# Patient Record
Sex: FEMALE | Race: WHITE | NOT HISPANIC OR LATINO | Employment: FULL TIME | URBAN - METROPOLITAN AREA
[De-identification: names, ages, dates, MRNs, and addresses within clinical notes are randomized per-mention and may not be internally consistent; named-entity substitution may affect disease eponyms.]

---

## 2018-02-28 ENCOUNTER — APPOINTMENT (OUTPATIENT)
Dept: PREADMISSION TESTING | Facility: HOSPITAL | Age: 48
End: 2018-02-28

## 2018-02-28 VITALS
SYSTOLIC BLOOD PRESSURE: 146 MMHG | HEIGHT: 69 IN | OXYGEN SATURATION: 100 % | RESPIRATION RATE: 20 BRPM | TEMPERATURE: 98.2 F | WEIGHT: 176.9 LBS | DIASTOLIC BLOOD PRESSURE: 86 MMHG | HEART RATE: 98 BPM | BODY MASS INDEX: 26.2 KG/M2

## 2018-02-28 LAB
ALBUMIN SERPL-MCNC: 4.4 G/DL (ref 3.5–5.2)
ALBUMIN/GLOB SERPL: 1.5 G/DL
ALP SERPL-CCNC: 47 U/L (ref 39–117)
ALT SERPL W P-5'-P-CCNC: 11 U/L (ref 1–33)
ANION GAP SERPL CALCULATED.3IONS-SCNC: 11.8 MMOL/L
AST SERPL-CCNC: 16 U/L (ref 1–32)
BACTERIA UR QL AUTO: ABNORMAL /HPF
BASOPHILS # BLD AUTO: 0.02 10*3/MM3 (ref 0–0.2)
BASOPHILS NFR BLD AUTO: 0.5 % (ref 0–1.5)
BILIRUB SERPL-MCNC: 0.8 MG/DL (ref 0.1–1.2)
BILIRUB UR QL STRIP: NEGATIVE
BUN BLD-MCNC: 15 MG/DL (ref 6–20)
BUN/CREAT SERPL: 19.7 (ref 7–25)
CALCIUM SPEC-SCNC: 9 MG/DL (ref 8.6–10.5)
CHLORIDE SERPL-SCNC: 101 MMOL/L (ref 98–107)
CLARITY UR: CLEAR
CO2 SERPL-SCNC: 24.2 MMOL/L (ref 22–29)
COLOR UR: YELLOW
CREAT BLD-MCNC: 0.76 MG/DL (ref 0.57–1)
DEPRECATED RDW RBC AUTO: 44 FL (ref 37–54)
EOSINOPHIL # BLD AUTO: 0.11 10*3/MM3 (ref 0–0.7)
EOSINOPHIL NFR BLD AUTO: 2.5 % (ref 0.3–6.2)
ERYTHROCYTE [DISTWIDTH] IN BLOOD BY AUTOMATED COUNT: 13.4 % (ref 11.7–13)
GFR SERPL CREATININE-BSD FRML MDRD: 81 ML/MIN/1.73
GLOBULIN UR ELPH-MCNC: 3 GM/DL
GLUCOSE BLD-MCNC: 159 MG/DL (ref 65–99)
GLUCOSE UR STRIP-MCNC: NEGATIVE MG/DL
HCG SERPL QL: NEGATIVE
HCT VFR BLD AUTO: 38.8 % (ref 35.6–45.5)
HGB BLD-MCNC: 12.8 G/DL (ref 11.9–15.5)
HGB UR QL STRIP.AUTO: NEGATIVE
HYALINE CASTS UR QL AUTO: ABNORMAL /LPF
KETONES UR QL STRIP: ABNORMAL
LEUKOCYTE ESTERASE UR QL STRIP.AUTO: ABNORMAL
LYMPHOCYTES # BLD AUTO: 1.36 10*3/MM3 (ref 0.9–4.8)
LYMPHOCYTES NFR BLD AUTO: 31.2 % (ref 19.6–45.3)
MCH RBC QN AUTO: 29.4 PG (ref 26.9–32)
MCHC RBC AUTO-ENTMCNC: 33 G/DL (ref 32.4–36.3)
MCV RBC AUTO: 89.2 FL (ref 80.5–98.2)
MONOCYTES # BLD AUTO: 0.5 10*3/MM3 (ref 0.2–1.2)
MONOCYTES NFR BLD AUTO: 11.5 % (ref 5–12)
NEUTROPHILS # BLD AUTO: 2.37 10*3/MM3 (ref 1.9–8.1)
NEUTROPHILS NFR BLD AUTO: 54.3 % (ref 42.7–76)
NITRITE UR QL STRIP: NEGATIVE
PH UR STRIP.AUTO: 6 [PH] (ref 5–8)
PLATELET # BLD AUTO: 239 10*3/MM3 (ref 140–500)
PMV BLD AUTO: 9.5 FL (ref 6–12)
POTASSIUM BLD-SCNC: 3.9 MMOL/L (ref 3.5–5.2)
PROT SERPL-MCNC: 7.4 G/DL (ref 6–8.5)
PROT UR QL STRIP: NEGATIVE
RBC # BLD AUTO: 4.35 10*6/MM3 (ref 3.9–5.2)
RBC # UR: ABNORMAL /HPF
REF LAB TEST METHOD: ABNORMAL
SODIUM BLD-SCNC: 137 MMOL/L (ref 136–145)
SP GR UR STRIP: 1.02 (ref 1–1.03)
SQUAMOUS #/AREA URNS HPF: ABNORMAL /HPF
UROBILINOGEN UR QL STRIP: ABNORMAL
WBC NRBC COR # BLD: 4.36 10*3/MM3 (ref 4.5–10.7)
WBC UR QL AUTO: ABNORMAL /HPF

## 2018-02-28 PROCEDURE — 84703 CHORIONIC GONADOTROPIN ASSAY: CPT | Performed by: ORTHOPAEDIC SURGERY

## 2018-02-28 PROCEDURE — 36415 COLL VENOUS BLD VENIPUNCTURE: CPT

## 2018-02-28 PROCEDURE — 93010 ELECTROCARDIOGRAM REPORT: CPT | Performed by: INTERNAL MEDICINE

## 2018-02-28 PROCEDURE — 81001 URINALYSIS AUTO W/SCOPE: CPT | Performed by: ORTHOPAEDIC SURGERY

## 2018-02-28 PROCEDURE — 85027 COMPLETE CBC AUTOMATED: CPT | Performed by: ORTHOPAEDIC SURGERY

## 2018-02-28 PROCEDURE — 93005 ELECTROCARDIOGRAM TRACING: CPT

## 2018-02-28 PROCEDURE — 80053 COMPREHEN METABOLIC PANEL: CPT | Performed by: ORTHOPAEDIC SURGERY

## 2018-02-28 RX ORDER — LISINOPRIL AND HYDROCHLOROTHIAZIDE 20; 12.5 MG/1; MG/1
1 TABLET ORAL DAILY
COMMUNITY

## 2018-03-06 ENCOUNTER — ANESTHESIA EVENT (OUTPATIENT)
Dept: PERIOP | Facility: HOSPITAL | Age: 48
End: 2018-03-06

## 2018-03-06 ENCOUNTER — ANESTHESIA (OUTPATIENT)
Dept: PERIOP | Facility: HOSPITAL | Age: 48
End: 2018-03-06

## 2018-03-06 ENCOUNTER — HOSPITAL ENCOUNTER (OUTPATIENT)
Facility: HOSPITAL | Age: 48
Setting detail: HOSPITAL OUTPATIENT SURGERY
Discharge: HOME | End: 2018-03-06
Attending: ORTHOPAEDIC SURGERY | Admitting: ORTHOPAEDIC SURGERY

## 2018-03-06 VITALS
DIASTOLIC BLOOD PRESSURE: 80 MMHG | RESPIRATION RATE: 18 BRPM | OXYGEN SATURATION: 100 % | TEMPERATURE: 97.6 F | SYSTOLIC BLOOD PRESSURE: 127 MMHG | HEART RATE: 70 BPM

## 2018-03-06 PROCEDURE — 25010000002 ROPIVACAINE PER 1 MG: Performed by: ANESTHESIOLOGY

## 2018-03-06 PROCEDURE — 25010000002 PROPOFOL 10 MG/ML EMULSION: Performed by: NURSE ANESTHETIST, CERTIFIED REGISTERED

## 2018-03-06 PROCEDURE — 25010000003 CEFAZOLIN IN DEXTROSE 2-4 GM/100ML-% SOLUTION: Performed by: ORTHOPAEDIC SURGERY

## 2018-03-06 PROCEDURE — 25010000002 MIDAZOLAM PER 1 MG: Performed by: ANESTHESIOLOGY

## 2018-03-06 PROCEDURE — 25010000002 DEXAMETHASONE PER 1 MG: Performed by: ANESTHESIOLOGY

## 2018-03-06 PROCEDURE — 25010000002 ONDANSETRON PER 1 MG: Performed by: NURSE ANESTHETIST, CERTIFIED REGISTERED

## 2018-03-06 PROCEDURE — 25010000002 EPINEPHRINE PER 0.1 MG: Performed by: ORTHOPAEDIC SURGERY

## 2018-03-06 RX ORDER — SODIUM CHLORIDE, SODIUM LACTATE, POTASSIUM CHLORIDE, CALCIUM CHLORIDE 600; 310; 30; 20 MG/100ML; MG/100ML; MG/100ML; MG/100ML
9 INJECTION, SOLUTION INTRAVENOUS CONTINUOUS
Status: DISCONTINUED | OUTPATIENT
Start: 2018-03-06 | End: 2018-03-06 | Stop reason: HOSPADM

## 2018-03-06 RX ORDER — FENTANYL CITRATE 50 UG/ML
50 INJECTION, SOLUTION INTRAMUSCULAR; INTRAVENOUS
Status: DISCONTINUED | OUTPATIENT
Start: 2018-03-06 | End: 2018-03-06 | Stop reason: HOSPADM

## 2018-03-06 RX ORDER — FLUMAZENIL 0.1 MG/ML
0.2 INJECTION INTRAVENOUS AS NEEDED
Status: DISCONTINUED | OUTPATIENT
Start: 2018-03-06 | End: 2018-03-06 | Stop reason: HOSPADM

## 2018-03-06 RX ORDER — PROPOFOL 10 MG/ML
VIAL (ML) INTRAVENOUS AS NEEDED
Status: DISCONTINUED | OUTPATIENT
Start: 2018-03-06 | End: 2018-03-06 | Stop reason: SURG

## 2018-03-06 RX ORDER — ONDANSETRON 2 MG/ML
4 INJECTION INTRAMUSCULAR; INTRAVENOUS ONCE AS NEEDED
Status: DISCONTINUED | OUTPATIENT
Start: 2018-03-06 | End: 2018-03-06 | Stop reason: HOSPADM

## 2018-03-06 RX ORDER — PROMETHAZINE HYDROCHLORIDE 25 MG/1
25 SUPPOSITORY RECTAL ONCE AS NEEDED
Status: DISCONTINUED | OUTPATIENT
Start: 2018-03-06 | End: 2018-03-06 | Stop reason: HOSPADM

## 2018-03-06 RX ORDER — MIDAZOLAM HYDROCHLORIDE 1 MG/ML
2 INJECTION INTRAMUSCULAR; INTRAVENOUS
Status: DISCONTINUED | OUTPATIENT
Start: 2018-03-06 | End: 2018-03-06 | Stop reason: HOSPADM

## 2018-03-06 RX ORDER — LIDOCAINE HYDROCHLORIDE 20 MG/ML
INJECTION, SOLUTION INFILTRATION; PERINEURAL AS NEEDED
Status: DISCONTINUED | OUTPATIENT
Start: 2018-03-06 | End: 2018-03-06 | Stop reason: SURG

## 2018-03-06 RX ORDER — HYDRALAZINE HYDROCHLORIDE 20 MG/ML
5 INJECTION INTRAMUSCULAR; INTRAVENOUS
Status: DISCONTINUED | OUTPATIENT
Start: 2018-03-06 | End: 2018-03-06 | Stop reason: HOSPADM

## 2018-03-06 RX ORDER — ONDANSETRON 2 MG/ML
INJECTION INTRAMUSCULAR; INTRAVENOUS AS NEEDED
Status: DISCONTINUED | OUTPATIENT
Start: 2018-03-06 | End: 2018-03-06 | Stop reason: SURG

## 2018-03-06 RX ORDER — CEFAZOLIN SODIUM 2 G/100ML
2 INJECTION, SOLUTION INTRAVENOUS ONCE
Status: COMPLETED | OUTPATIENT
Start: 2018-03-06 | End: 2018-03-06

## 2018-03-06 RX ORDER — FAMOTIDINE 10 MG/ML
20 INJECTION, SOLUTION INTRAVENOUS ONCE
Status: COMPLETED | OUTPATIENT
Start: 2018-03-06 | End: 2018-03-06

## 2018-03-06 RX ORDER — BUPIVACAINE HYDROCHLORIDE AND EPINEPHRINE 5; 5 MG/ML; UG/ML
INJECTION, SOLUTION PERINEURAL AS NEEDED
Status: DISCONTINUED | OUTPATIENT
Start: 2018-03-06 | End: 2018-03-06 | Stop reason: HOSPADM

## 2018-03-06 RX ORDER — NALOXONE HCL 0.4 MG/ML
0.2 VIAL (ML) INJECTION AS NEEDED
Status: DISCONTINUED | OUTPATIENT
Start: 2018-03-06 | End: 2018-03-06 | Stop reason: HOSPADM

## 2018-03-06 RX ORDER — SODIUM CHLORIDE 0.9 % (FLUSH) 0.9 %
1-10 SYRINGE (ML) INJECTION AS NEEDED
Status: DISCONTINUED | OUTPATIENT
Start: 2018-03-06 | End: 2018-03-06 | Stop reason: HOSPADM

## 2018-03-06 RX ORDER — EPHEDRINE SULFATE 50 MG/ML
5 INJECTION, SOLUTION INTRAVENOUS ONCE AS NEEDED
Status: DISCONTINUED | OUTPATIENT
Start: 2018-03-06 | End: 2018-03-06 | Stop reason: HOSPADM

## 2018-03-06 RX ORDER — PROMETHAZINE HYDROCHLORIDE 25 MG/1
12.5 TABLET ORAL ONCE AS NEEDED
Status: DISCONTINUED | OUTPATIENT
Start: 2018-03-06 | End: 2018-03-06 | Stop reason: HOSPADM

## 2018-03-06 RX ORDER — HYDROCODONE BITARTRATE AND ACETAMINOPHEN 7.5; 325 MG/1; MG/1
1 TABLET ORAL ONCE AS NEEDED
Status: DISCONTINUED | OUTPATIENT
Start: 2018-03-06 | End: 2018-03-06 | Stop reason: HOSPADM

## 2018-03-06 RX ORDER — OXYCODONE AND ACETAMINOPHEN 7.5; 325 MG/1; MG/1
1 TABLET ORAL ONCE AS NEEDED
Status: DISCONTINUED | OUTPATIENT
Start: 2018-03-06 | End: 2018-03-06 | Stop reason: HOSPADM

## 2018-03-06 RX ORDER — ROPIVACAINE HYDROCHLORIDE 5 MG/ML
INJECTION, SOLUTION EPIDURAL; INFILTRATION; PERINEURAL AS NEEDED
Status: DISCONTINUED | OUTPATIENT
Start: 2018-03-06 | End: 2018-03-06 | Stop reason: SURG

## 2018-03-06 RX ORDER — DIPHENHYDRAMINE HYDROCHLORIDE 50 MG/ML
12.5 INJECTION INTRAMUSCULAR; INTRAVENOUS
Status: DISCONTINUED | OUTPATIENT
Start: 2018-03-06 | End: 2018-03-06 | Stop reason: HOSPADM

## 2018-03-06 RX ORDER — ROCURONIUM BROMIDE 10 MG/ML
INJECTION, SOLUTION INTRAVENOUS AS NEEDED
Status: DISCONTINUED | OUTPATIENT
Start: 2018-03-06 | End: 2018-03-06 | Stop reason: SURG

## 2018-03-06 RX ORDER — DEXAMETHASONE SODIUM PHOSPHATE 4 MG/ML
INJECTION, SOLUTION INTRA-ARTICULAR; INTRALESIONAL; INTRAMUSCULAR; INTRAVENOUS; SOFT TISSUE AS NEEDED
Status: DISCONTINUED | OUTPATIENT
Start: 2018-03-06 | End: 2018-03-06 | Stop reason: SURG

## 2018-03-06 RX ORDER — LIDOCAINE HYDROCHLORIDE 10 MG/ML
0.5 INJECTION, SOLUTION EPIDURAL; INFILTRATION; INTRACAUDAL; PERINEURAL ONCE AS NEEDED
Status: DISCONTINUED | OUTPATIENT
Start: 2018-03-06 | End: 2018-03-06 | Stop reason: HOSPADM

## 2018-03-06 RX ORDER — LABETALOL HYDROCHLORIDE 5 MG/ML
5 INJECTION, SOLUTION INTRAVENOUS
Status: DISCONTINUED | OUTPATIENT
Start: 2018-03-06 | End: 2018-03-06 | Stop reason: HOSPADM

## 2018-03-06 RX ORDER — PROMETHAZINE HYDROCHLORIDE 25 MG/1
25 TABLET ORAL ONCE AS NEEDED
Status: DISCONTINUED | OUTPATIENT
Start: 2018-03-06 | End: 2018-03-06 | Stop reason: HOSPADM

## 2018-03-06 RX ORDER — PROMETHAZINE HYDROCHLORIDE 25 MG/ML
12.5 INJECTION, SOLUTION INTRAMUSCULAR; INTRAVENOUS ONCE AS NEEDED
Status: DISCONTINUED | OUTPATIENT
Start: 2018-03-06 | End: 2018-03-06 | Stop reason: HOSPADM

## 2018-03-06 RX ORDER — ACETAMINOPHEN 325 MG/1
650 TABLET ORAL ONCE
Status: COMPLETED | OUTPATIENT
Start: 2018-03-06 | End: 2018-03-06

## 2018-03-06 RX ADMIN — ROPIVACAINE HYDROCHLORIDE 20 ML: 5 INJECTION, SOLUTION EPIDURAL; INFILTRATION; PERINEURAL at 06:25

## 2018-03-06 RX ADMIN — SUGAMMADEX 200 MG: 100 INJECTION, SOLUTION INTRAVENOUS at 07:49

## 2018-03-06 RX ADMIN — ONDANSETRON 4 MG: 2 INJECTION INTRAMUSCULAR; INTRAVENOUS at 07:49

## 2018-03-06 RX ADMIN — MIDAZOLAM 2 MG: 1 INJECTION INTRAMUSCULAR; INTRAVENOUS at 06:20

## 2018-03-06 RX ADMIN — LIDOCAINE HYDROCHLORIDE 100 MG: 20 INJECTION, SOLUTION INFILTRATION; PERINEURAL at 06:52

## 2018-03-06 RX ADMIN — ROCURONIUM BROMIDE 40 MG: 10 INJECTION INTRAVENOUS at 06:52

## 2018-03-06 RX ADMIN — SODIUM CHLORIDE, POTASSIUM CHLORIDE, SODIUM LACTATE AND CALCIUM CHLORIDE 9 ML/HR: 600; 310; 30; 20 INJECTION, SOLUTION INTRAVENOUS at 06:12

## 2018-03-06 RX ADMIN — CEFAZOLIN SODIUM 2 G: 2 INJECTION, SOLUTION INTRAVENOUS at 06:51

## 2018-03-06 RX ADMIN — PROPOFOL 150 MG: 10 INJECTION, EMULSION INTRAVENOUS at 06:52

## 2018-03-06 RX ADMIN — FAMOTIDINE 20 MG: 10 INJECTION, SOLUTION INTRAVENOUS at 06:12

## 2018-03-06 RX ADMIN — DEXAMETHASONE SODIUM PHOSPHATE 4 MG: 4 INJECTION, SOLUTION INTRAMUSCULAR; INTRAVENOUS at 06:25

## 2018-03-06 RX ADMIN — ACETAMINOPHEN 650 MG: 325 TABLET, FILM COATED ORAL at 06:12

## 2018-03-06 NOTE — ANESTHESIA PROCEDURE NOTES
Peripheral Block    Patient location during procedure: pre-op  Reason for block: at surgeon's request and post-op pain management  Performed by  Anesthesiologist: RENDER, CHRISTIANO RAY  Preanesthetic Checklist  Completed: patient identified, site marked, surgical consent, pre-op evaluation, timeout performed, IV checked, risks and benefits discussed and monitors and equipment checked  Prep:  Sterile barriers:gloves  Prep: ChloraPrep  Patient monitoring: blood pressure monitoring, continuous pulse oximetry and EKG  Procedure  Sedation:yes    Guidance:ultrasound guided  ULTRASOUND INTERPRETATION.  Using ultrasound guidance a 22 G gauge needle was placed in close proximity to the brachial plexus nerve, at which point, under ultrasound guidance anesthetic was injected in the area of the nerve and spread of the anesthesia was seen on ultrasound in close proximity thereto.  There were no abnormalities seen on ultrasound; a digital image was taken; and the patient tolerated the procedure with no complications. Images:still images obtained    Block Type:interscalene  Injection Technique:single-shot  Needle Type:short-bevel  Needle Gauge:22 G    Medications  Analgesia: Dexamethasone 4mg.  Comment:Dexamethasone 4mg added to injectate  Local Injected:ropivacaine 0.5% Local Amount Injected:20mL  Post Assessment  Injection Assessment: negative aspiration for heme, no paresthesia on injection and incremental injection  Patient Tolerance:comfortable throughout block  Complications:no

## 2018-03-06 NOTE — BRIEF OP NOTE
SHOULDER ARTHROSCOPY  Progress Note    Sherrie Harris  3/6/2018    Pre-op Diagnosis:   Lt tam, ac djd, lt, bt       Post-Op Diagnosis Codes:   same, djd    Procedure/CPT® Codes:      Procedure(s):  LT SHOULDER ARTHROSCOPY SUBACROMIAL DECOMPRESSION DISTAL CLAVICLE EXCISION BICEPS TENOTOMY    Surgeon(s):  TITUS Huntley MD    Anesthesia: General with Block    Staff:   Circulator: Malissa Aviles RN  Scrub Person: Ayad Milton  Assistant: Maern Sutton    Estimated Blood Loss: minimal    Urine Voided: * No values recorded between 3/6/2018  6:49 AM and 3/6/2018  7:50 AM *    Specimens:                None      Drains:           Findings: above    Complications: none known      OLGA Huntley MD     Date: 3/6/2018  Time: 7:50 AM

## 2018-03-06 NOTE — ANESTHESIA POSTPROCEDURE EVALUATION
Patient: Sherrie Harris    Procedure Summary     Date Anesthesia Start Anesthesia Stop Room / Location    03/06/18 0651 0801  [facility]  [facility]       Procedure Diagnosis Surgeon Provider    LEFT SHOULDER ARTHROSCOPY,  SUBACROMIAL DECOMPRESSION,  DISTAL CLAVICLE EXCISION,  BICEPS TENOTOMY, and LABRAL DEBRIDEMENT (Left Shoulder) No diagnosis on file. MD Yuri Yeh MD          Anesthesia Type: general, regional  Last vitals  BP   127/80 (03/06/18 0845)   Temp   36.4 °C (97.6 °F) (03/06/18 0830)   Pulse   70 (03/06/18 0908)   Resp   18 (03/06/18 0908)     SpO2   100 % (03/06/18 0908)     Post Anesthesia Care and Evaluation    Patient location during evaluation: bedside  Patient participation: complete - patient participated  Level of consciousness: awake and alert  Pain management: adequate  Airway patency: patent  Anesthetic complications: No anesthetic complications    Cardiovascular status: acceptable  Respiratory status: acceptable  Hydration status: acceptable    Comments: /80 (BP Location: Right arm, Patient Position: Lying)  Pulse 70  Temp 36.4 °C (97.6 °F)  Resp 18  LMP 03/06/2018  SpO2 100%

## 2018-03-06 NOTE — PLAN OF CARE
Problem: Patient Care Overview (Adult)  Goal: Plan of Care Review  Outcome: Ongoing (interventions implemented as appropriate)   03/06/18 0814   Coping/Psychosocial Response Interventions   Plan Of Care Reviewed With patient   Patient Care Overview   Progress improving

## 2018-03-06 NOTE — PLAN OF CARE
Problem: Perioperative Period (Adult)  Goal: Signs and Symptoms of Listed Potential Problems Will be Absent or Manageable (Perioperative Period)   03/06/18 0814   Perioperative Period   Problems Assessed (Perioperative Period) all   Problems Present (Perioperative Period) none

## 2018-03-06 NOTE — ANESTHESIA PROCEDURE NOTES
Airway  Urgency: elective    Airway not difficult    General Information and Staff    Patient location during procedure: OR  Anesthesiologist: RENDER, CHRISTIANO RAY  CRNA: VAZQUEZ ENRIQUEZ    Indications and Patient Condition  Indications for airway management: airway protection    Preoxygenated: yes  MILS not maintained throughout  Mask difficulty assessment: 1 - vent by mask    Final Airway Details  Final airway type: endotracheal airway      Successful airway: ETT  Cuffed: yes   Successful intubation technique: direct laryngoscopy  Facilitating devices/methods: intubating stylet  Endotracheal tube insertion site: oral  Blade: Metz  Blade size: #2  ETT size: 7.0 mm  Cormack-Lehane Classification: grade IIb - view of arytenoids or posterior of glottis only  Placement verified by: chest auscultation and capnometry   Cuff volume (mL): 8  Measured from: lips  Number of attempts at approach: 1    Additional Comments  PreO2, SIVI, easy BMV, minimal mouth opening, grade II-III view, ett placed X 1 attempt w eschmann, appears atraumatic, dentition intact

## 2018-03-06 NOTE — PLAN OF CARE
Problem: Patient Care Overview (Adult)  Goal: Plan of Care Review  Outcome: Ongoing (interventions implemented as appropriate)   03/06/18 0554   Coping/Psychosocial Response Interventions   Plan Of Care Reviewed With patient   Patient Care Overview   Progress improving     Goal: Adult Individualization and Mutuality  Outcome: Ongoing (interventions implemented as appropriate)    Goal: Discharge Needs Assessment  Outcome: Ongoing (interventions implemented as appropriate)   03/06/18 0554   Discharge Needs Assessment   Concerns To Be Addressed no discharge needs identified

## 2018-03-06 NOTE — ANESTHESIA POSTPROCEDURE EVALUATION
Patient: Sherrie Harris    Procedure Summary     Date Anesthesia Start Anesthesia Stop Room / Location    03/06/18 0651 0801  [facility]       Procedure Diagnosis Surgeon Provider    LEFT SHOULDER ARTHROSCOPY,  SUBACROMIAL DECOMPRESSION,  DISTAL CLAVICLE EXCISION,  BICEPS TENOTOMY, and LABRAL DEBRIDEMENT (Left Shoulder) No diagnosis on file. MD Yuri Yeh MD          Anesthesia Type: general, regional  Last vitals  BP   127/80 (03/06/18 0845)   Temp   36.4 °C (97.6 °F) (03/06/18 0830)   Pulse   70 (03/06/18 0908)   Resp   18 (03/06/18 0908)     SpO2   100 % (03/06/18 0908)     Post Anesthesia Care and Evaluation    Patient location during evaluation: bedside  Patient participation: complete - patient participated  Level of consciousness: awake  Pain score: 1  Pain management: adequate  Airway patency: patent  Anesthetic complications: No anesthetic complications    Cardiovascular status: acceptable  Respiratory status: acceptable  Hydration status: acceptable    Comments: /80 (BP Location: Right arm, Patient Position: Lying)  Pulse 70  Temp 36.4 °C (97.6 °F)  Resp 18  LMP 03/06/2018  SpO2 100%

## 2018-03-06 NOTE — PLAN OF CARE
Problem: Perioperative Period (Adult)  Goal: Signs and Symptoms of Listed Potential Problems Will be Absent or Manageable (Perioperative Period)  Outcome: Ongoing (interventions implemented as appropriate)   03/06/18 0553   Perioperative Period   Problems Assessed (Perioperative Period) all   Problems Present (Perioperative Period) none

## 2021-10-08 ENCOUNTER — TRANSCRIBE ORDERS (OUTPATIENT)
Dept: ADMINISTRATIVE | Facility: HOSPITAL | Age: 51
End: 2021-10-08

## 2021-10-08 DIAGNOSIS — M79.671 RIGHT FOOT PAIN: Primary | ICD-10-CM

## 2021-10-28 ENCOUNTER — HOSPITAL ENCOUNTER (OUTPATIENT)
Dept: GENERAL RADIOLOGY | Facility: HOSPITAL | Age: 51
Discharge: HOME | End: 2021-10-28
Admitting: ORTHOPAEDIC SURGERY

## 2021-10-28 DIAGNOSIS — M79.671 RIGHT FOOT PAIN: ICD-10-CM

## 2021-10-28 PROCEDURE — 25010000002 METHYLPREDNISOLONE PER 125 MG: Performed by: ORTHOPAEDIC SURGERY

## 2021-10-28 PROCEDURE — 77002 NEEDLE LOCALIZATION BY XRAY: CPT

## 2021-10-28 PROCEDURE — 0 LIDOCAINE 1 % SOLUTION: Performed by: ORTHOPAEDIC SURGERY

## 2021-10-28 PROCEDURE — 25010000002 IOPAMIDOL 61 % SOLUTION: Performed by: ORTHOPAEDIC SURGERY

## 2021-10-28 RX ORDER — LIDOCAINE HYDROCHLORIDE 10 MG/ML
10 INJECTION, SOLUTION INFILTRATION; PERINEURAL ONCE
Status: COMPLETED | OUTPATIENT
Start: 2021-10-28 | End: 2021-10-28

## 2021-10-28 RX ORDER — METHYLPREDNISOLONE SODIUM SUCCINATE 125 MG/2ML
80 INJECTION, POWDER, LYOPHILIZED, FOR SOLUTION INTRAMUSCULAR; INTRAVENOUS
Status: COMPLETED | OUTPATIENT
Start: 2021-10-28 | End: 2021-10-28

## 2021-10-28 RX ORDER — BUPIVACAINE HYDROCHLORIDE 2.5 MG/ML
10 INJECTION, SOLUTION EPIDURAL; INFILTRATION; INTRACAUDAL ONCE
Status: COMPLETED | OUTPATIENT
Start: 2021-10-28 | End: 2021-10-28

## 2021-10-28 RX ADMIN — METHYLPREDNISOLONE SODIUM SUCCINATE 80 MG: 125 INJECTION, POWDER, LYOPHILIZED, FOR SOLUTION INTRAMUSCULAR; INTRAVENOUS at 14:54

## 2021-10-28 RX ADMIN — LIDOCAINE HYDROCHLORIDE 1 ML: 10 INJECTION, SOLUTION INFILTRATION; PERINEURAL at 14:54

## 2021-10-28 RX ADMIN — IOPAMIDOL 1 ML: 612 INJECTION, SOLUTION INTRAVENOUS at 14:54

## 2021-10-28 RX ADMIN — BUPIVACAINE HYDROCHLORIDE 5 ML: 2.5 INJECTION, SOLUTION EPIDURAL; INFILTRATION; INTRACAUDAL; PERINEURAL at 14:54

## 2021-12-21 ENCOUNTER — TRANSCRIBE ORDERS (OUTPATIENT)
Dept: ADMINISTRATIVE | Facility: HOSPITAL | Age: 51
End: 2021-12-21

## 2021-12-21 ENCOUNTER — HOSPITAL ENCOUNTER (OUTPATIENT)
Dept: CARDIOLOGY | Facility: HOSPITAL | Age: 51
Discharge: HOME | End: 2021-12-21

## 2021-12-21 ENCOUNTER — LAB (OUTPATIENT)
Dept: LAB | Facility: HOSPITAL | Age: 51
End: 2021-12-21

## 2021-12-21 DIAGNOSIS — Z01.818 PRE-OP TESTING: Primary | ICD-10-CM

## 2021-12-21 DIAGNOSIS — Z01.818 PRE-OP TESTING: ICD-10-CM

## 2021-12-21 LAB
ALBUMIN SERPL-MCNC: 4.7 G/DL (ref 3.5–5.2)
ALBUMIN/GLOB SERPL: 1.5 G/DL
ALP SERPL-CCNC: 63 U/L (ref 39–117)
ALT SERPL W P-5'-P-CCNC: 11 U/L (ref 1–33)
ANION GAP SERPL CALCULATED.3IONS-SCNC: 8.3 MMOL/L (ref 5–15)
AST SERPL-CCNC: 18 U/L (ref 1–32)
BASOPHILS # BLD AUTO: 0.04 10*3/MM3 (ref 0–0.2)
BASOPHILS NFR BLD AUTO: 1 % (ref 0–1.5)
BILIRUB SERPL-MCNC: 0.8 MG/DL (ref 0–1.2)
BUN SERPL-MCNC: 12 MG/DL (ref 6–20)
BUN/CREAT SERPL: 17.6 (ref 7–25)
CALCIUM SPEC-SCNC: 9.6 MG/DL (ref 8.6–10.5)
CHLORIDE SERPL-SCNC: 102 MMOL/L (ref 98–107)
CO2 SERPL-SCNC: 29.7 MMOL/L (ref 22–29)
CREAT SERPL-MCNC: 0.68 MG/DL (ref 0.57–1)
DEPRECATED RDW RBC AUTO: 43.3 FL (ref 37–54)
EOSINOPHIL # BLD AUTO: 0.12 10*3/MM3 (ref 0–0.4)
EOSINOPHIL NFR BLD AUTO: 3 % (ref 0.3–6.2)
ERYTHROCYTE [DISTWIDTH] IN BLOOD BY AUTOMATED COUNT: 13.4 % (ref 12.3–15.4)
GFR SERPL CREATININE-BSD FRML MDRD: 91 ML/MIN/1.73
GLOBULIN UR ELPH-MCNC: 3.1 GM/DL
GLUCOSE SERPL-MCNC: 163 MG/DL (ref 65–99)
HCT VFR BLD AUTO: 40.3 % (ref 34–46.6)
HGB BLD-MCNC: 13.3 G/DL (ref 12–15.9)
IMM GRANULOCYTES # BLD AUTO: 0 10*3/MM3 (ref 0–0.05)
IMM GRANULOCYTES NFR BLD AUTO: 0 % (ref 0–0.5)
LYMPHOCYTES # BLD AUTO: 0.93 10*3/MM3 (ref 0.7–3.1)
LYMPHOCYTES NFR BLD AUTO: 23.1 % (ref 19.6–45.3)
MCH RBC QN AUTO: 29 PG (ref 26.6–33)
MCHC RBC AUTO-ENTMCNC: 33 G/DL (ref 31.5–35.7)
MCV RBC AUTO: 88 FL (ref 79–97)
MONOCYTES # BLD AUTO: 0.36 10*3/MM3 (ref 0.1–0.9)
MONOCYTES NFR BLD AUTO: 9 % (ref 5–12)
NEUTROPHILS NFR BLD AUTO: 2.57 10*3/MM3 (ref 1.7–7)
NEUTROPHILS NFR BLD AUTO: 63.9 % (ref 42.7–76)
NRBC BLD AUTO-RTO: 0 /100 WBC (ref 0–0.2)
PLATELET # BLD AUTO: 273 10*3/MM3 (ref 140–450)
PMV BLD AUTO: 9.4 FL (ref 6–12)
POTASSIUM SERPL-SCNC: 4.3 MMOL/L (ref 3.5–5.2)
PROT SERPL-MCNC: 7.8 G/DL (ref 6–8.5)
QT INTERVAL: 411 MS
RBC # BLD AUTO: 4.58 10*6/MM3 (ref 3.77–5.28)
SODIUM SERPL-SCNC: 140 MMOL/L (ref 136–145)
WBC NRBC COR # BLD: 4.02 10*3/MM3 (ref 3.4–10.8)

## 2021-12-21 PROCEDURE — 93010 ELECTROCARDIOGRAM REPORT: CPT | Performed by: INTERNAL MEDICINE

## 2021-12-21 PROCEDURE — 85025 COMPLETE CBC W/AUTO DIFF WBC: CPT

## 2021-12-21 PROCEDURE — 93005 ELECTROCARDIOGRAM TRACING: CPT | Performed by: ORTHOPAEDIC SURGERY

## 2021-12-21 PROCEDURE — 36415 COLL VENOUS BLD VENIPUNCTURE: CPT

## 2021-12-21 PROCEDURE — 80053 COMPREHEN METABOLIC PANEL: CPT

## 2023-10-05 ENCOUNTER — LAB (OUTPATIENT)
Dept: LAB | Facility: HOSPITAL | Age: 53
End: 2023-10-05
Payer: MEDICAID

## 2023-10-05 ENCOUNTER — HOSPITAL ENCOUNTER (OUTPATIENT)
Dept: CARDIOLOGY | Facility: HOSPITAL | Age: 53
Discharge: HOME | End: 2023-10-05
Payer: MEDICAID

## 2023-10-05 ENCOUNTER — TRANSCRIBE ORDERS (OUTPATIENT)
Dept: ADMINISTRATIVE | Facility: HOSPITAL | Age: 53
End: 2023-10-05
Payer: MEDICAID

## 2023-10-05 DIAGNOSIS — Z01.818 PRE-OP TESTING: Primary | ICD-10-CM

## 2023-10-05 DIAGNOSIS — Z01.818 PRE-OP TESTING: ICD-10-CM

## 2023-10-05 LAB
ALBUMIN SERPL-MCNC: 5 G/DL (ref 3.5–5.2)
ALBUMIN/GLOB SERPL: 2 G/DL
ALP SERPL-CCNC: 90 U/L (ref 39–117)
ALT SERPL W P-5'-P-CCNC: 14 U/L (ref 1–33)
ANION GAP SERPL CALCULATED.3IONS-SCNC: 10.2 MMOL/L (ref 5–15)
AST SERPL-CCNC: 19 U/L (ref 1–32)
BASOPHILS # BLD AUTO: 0.05 10*3/MM3 (ref 0–0.2)
BASOPHILS NFR BLD AUTO: 1.3 % (ref 0–1.5)
BILIRUB SERPL-MCNC: 0.7 MG/DL (ref 0–1.2)
BUN SERPL-MCNC: 14 MG/DL (ref 6–20)
BUN/CREAT SERPL: 20.6 (ref 7–25)
CALCIUM SPEC-SCNC: 9.8 MG/DL (ref 8.6–10.5)
CHLORIDE SERPL-SCNC: 98 MMOL/L (ref 98–107)
CO2 SERPL-SCNC: 28.8 MMOL/L (ref 22–29)
CREAT SERPL-MCNC: 0.68 MG/DL (ref 0.57–1)
DEPRECATED RDW RBC AUTO: 43.5 FL (ref 37–54)
EGFRCR SERPLBLD CKD-EPI 2021: 103.6 ML/MIN/1.73
EOSINOPHIL # BLD AUTO: 0.12 10*3/MM3 (ref 0–0.4)
EOSINOPHIL NFR BLD AUTO: 3.1 % (ref 0.3–6.2)
ERYTHROCYTE [DISTWIDTH] IN BLOOD BY AUTOMATED COUNT: 13.9 % (ref 12.3–15.4)
GLOBULIN UR ELPH-MCNC: 2.5 GM/DL
GLUCOSE SERPL-MCNC: 142 MG/DL (ref 65–99)
HCT VFR BLD AUTO: 36 % (ref 34–46.6)
HGB BLD-MCNC: 12 G/DL (ref 12–15.9)
IMM GRANULOCYTES # BLD AUTO: 0 10*3/MM3 (ref 0–0.05)
IMM GRANULOCYTES NFR BLD AUTO: 0 % (ref 0–0.5)
LYMPHOCYTES # BLD AUTO: 1.33 10*3/MM3 (ref 0.7–3.1)
LYMPHOCYTES NFR BLD AUTO: 34.4 % (ref 19.6–45.3)
MCH RBC QN AUTO: 28.5 PG (ref 26.6–33)
MCHC RBC AUTO-ENTMCNC: 33.3 G/DL (ref 31.5–35.7)
MCV RBC AUTO: 85.5 FL (ref 79–97)
MONOCYTES # BLD AUTO: 0.35 10*3/MM3 (ref 0.1–0.9)
MONOCYTES NFR BLD AUTO: 9 % (ref 5–12)
NEUTROPHILS NFR BLD AUTO: 2.02 10*3/MM3 (ref 1.7–7)
NEUTROPHILS NFR BLD AUTO: 52.2 % (ref 42.7–76)
NRBC BLD AUTO-RTO: 0 /100 WBC (ref 0–0.2)
PLATELET # BLD AUTO: 237 10*3/MM3 (ref 140–450)
PMV BLD AUTO: 9.6 FL (ref 6–12)
POTASSIUM SERPL-SCNC: 3.3 MMOL/L (ref 3.5–5.2)
PROT SERPL-MCNC: 7.5 G/DL (ref 6–8.5)
QT INTERVAL: 398 MS
QTC INTERVAL: 460 MS
RBC # BLD AUTO: 4.21 10*6/MM3 (ref 3.77–5.28)
SODIUM SERPL-SCNC: 137 MMOL/L (ref 136–145)
WBC NRBC COR # BLD: 3.87 10*3/MM3 (ref 3.4–10.8)

## 2023-10-05 PROCEDURE — 36415 COLL VENOUS BLD VENIPUNCTURE: CPT

## 2023-10-05 PROCEDURE — 80053 COMPREHEN METABOLIC PANEL: CPT

## 2023-10-05 PROCEDURE — 85025 COMPLETE CBC W/AUTO DIFF WBC: CPT

## 2023-10-05 PROCEDURE — 93005 ELECTROCARDIOGRAM TRACING: CPT | Performed by: ORTHOPAEDIC SURGERY

## 2024-06-06 ENCOUNTER — OFFICE VISIT (OUTPATIENT)
Dept: FAMILY MEDICINE CLINIC | Facility: CLINIC | Age: 54
End: 2024-06-06
Payer: MEDICAID

## 2024-06-06 VITALS
HEIGHT: 69 IN | BODY MASS INDEX: 25.48 KG/M2 | DIASTOLIC BLOOD PRESSURE: 80 MMHG | OXYGEN SATURATION: 99 % | WEIGHT: 172 LBS | SYSTOLIC BLOOD PRESSURE: 140 MMHG | HEART RATE: 84 BPM

## 2024-06-06 DIAGNOSIS — Z12.11 SCREENING FOR COLON CANCER: ICD-10-CM

## 2024-06-06 DIAGNOSIS — I10 BENIGN ESSENTIAL HTN: ICD-10-CM

## 2024-06-06 DIAGNOSIS — Z00.00 ANNUAL PHYSICAL EXAM: Primary | ICD-10-CM

## 2024-06-06 DIAGNOSIS — Z12.4 SCREENING FOR CERVICAL CANCER: ICD-10-CM

## 2024-06-06 DIAGNOSIS — Z13.21 ENCOUNTER FOR VITAMIN DEFICIENCY SCREENING: ICD-10-CM

## 2024-06-06 DIAGNOSIS — Z11.59 NEED FOR HEPATITIS C SCREENING TEST: ICD-10-CM

## 2024-06-06 DIAGNOSIS — Z13.220 SCREENING CHOLESTEROL LEVEL: ICD-10-CM

## 2024-06-06 DIAGNOSIS — Z23 IMMUNIZATION DUE: ICD-10-CM

## 2024-06-06 DIAGNOSIS — Z12.31 ENCOUNTER FOR SCREENING MAMMOGRAM FOR MALIGNANT NEOPLASM OF BREAST: ICD-10-CM

## 2024-06-06 LAB
BILIRUB BLD-MCNC: NEGATIVE MG/DL
CLARITY, POC: CLEAR
COLOR UR: YELLOW
EXPIRATION DATE: NORMAL
GLUCOSE UR STRIP-MCNC: NEGATIVE MG/DL
KETONES UR QL: NEGATIVE
LEUKOCYTE EST, POC: NEGATIVE
Lab: NORMAL
NITRITE UR-MCNC: NEGATIVE MG/ML
PH UR: 5.5 [PH] (ref 5–8)
PROT UR STRIP-MCNC: NEGATIVE MG/DL
RBC # UR STRIP: NEGATIVE /UL
SP GR UR: 1.02 (ref 1–1.03)
UROBILINOGEN UR QL: NORMAL

## 2024-06-06 PROCEDURE — 90715 TDAP VACCINE 7 YRS/> IM: CPT | Performed by: NURSE PRACTITIONER

## 2024-06-06 PROCEDURE — 1159F MED LIST DOCD IN RCRD: CPT | Performed by: NURSE PRACTITIONER

## 2024-06-06 PROCEDURE — 90471 IMMUNIZATION ADMIN: CPT | Performed by: NURSE PRACTITIONER

## 2024-06-06 PROCEDURE — 3079F DIAST BP 80-89 MM HG: CPT | Performed by: NURSE PRACTITIONER

## 2024-06-06 PROCEDURE — 1160F RVW MEDS BY RX/DR IN RCRD: CPT | Performed by: NURSE PRACTITIONER

## 2024-06-06 PROCEDURE — 2014F MENTAL STATUS ASSESS: CPT | Performed by: NURSE PRACTITIONER

## 2024-06-06 PROCEDURE — 3077F SYST BP >= 140 MM HG: CPT | Performed by: NURSE PRACTITIONER

## 2024-06-06 PROCEDURE — 99386 PREV VISIT NEW AGE 40-64: CPT | Performed by: NURSE PRACTITIONER

## 2024-06-06 PROCEDURE — 81003 URINALYSIS AUTO W/O SCOPE: CPT | Performed by: NURSE PRACTITIONER

## 2024-06-06 RX ORDER — HYDROCHLOROTHIAZIDE 25 MG/1
25 TABLET ORAL DAILY
COMMUNITY
End: 2024-06-06 | Stop reason: SDUPTHER

## 2024-06-06 RX ORDER — HYDROCHLOROTHIAZIDE 25 MG/1
25 TABLET ORAL DAILY
Qty: 90 TABLET | Refills: 3 | Status: SHIPPED | OUTPATIENT
Start: 2024-06-06

## 2024-06-06 RX ORDER — HYDROCHLOROTHIAZIDE 12.5 MG/1
1 TABLET ORAL DAILY
COMMUNITY
Start: 2023-09-28 | End: 2024-06-06

## 2024-06-06 RX ORDER — AMLODIPINE BESYLATE 5 MG/1
5 TABLET ORAL DAILY
Qty: 90 TABLET | Refills: 3 | Status: SHIPPED | OUTPATIENT
Start: 2024-06-06

## 2024-06-06 RX ORDER — IBUPROFEN 800 MG/1
TABLET ORAL
COMMUNITY
End: 2024-06-06

## 2024-06-06 RX ORDER — AMLODIPINE BESYLATE 5 MG/1
TABLET ORAL
COMMUNITY
Start: 2023-09-28 | End: 2024-06-06 | Stop reason: SDUPTHER

## 2024-06-06 NOTE — PROGRESS NOTES
"Chief Complaint  Establish Care (Switching care from [facility] )    Subjective          Sherrie Harris presents to [facility] for preventative yearly exam.   History of Present Illness    Presents to establish care, annual exam, and refills.  Past history of hypertension and doing well on amlodipine and hydrochlorothiazide.  No smoking no alcohol use.  Tries to watch her diet she does stay active walking 3 miles a day.  She is due a mammogram and a colonoscopy.  States she follows up [facility] and they keep up-to-date with her Pap smears.  She would like to get a Tdap vaccine today and denies other immunizations.  No dizziness no headache no chest pain no chest pressure no shortness of breath no trouble breathing no urinary or bowel issues.  Overall states she is doing well.    Objective   Vital Signs:   /80   Pulse 84   Ht 175.3 cm (69\")   Wt 78 kg (172 lb)   SpO2 99%   BMI 25.40 kg/m²     Body mass index is 25.4 kg/m².    Predictive Model Details   No score data available for Risk of Fall        PHQ-9 Depression Screening  Little interest or pleasure in doing things? 0-->not at all   Feeling down, depressed, or hopeless? 0-->not at all   Trouble falling or staying asleep, or sleeping too much?     Feeling tired or having little energy?     Poor appetite or overeating?     Feeling bad about yourself - or that you are a failure or have let yourself or your family down?     Trouble concentrating on things, such as reading the newspaper or watching television?     Moving or speaking so slowly that other people could have noticed? Or the opposite - being so fidgety or restless that you have been moving around a lot more than usual?     Thoughts that you would be better off dead, or of hurting yourself in some way?     PHQ-9 Total Score 0   If you checked off any problems, how difficult have these problems made it for you to " do your work, take care of things at home, or get along with other people?       Patient screened positive for depression based on a PHQ-9 score of 0 on 6/6/2024. Follow-up recommendations include:        Immunization History   Administered Date(s) Administered    31-influenza Vac Quardvalent Preservativ 10/01/2020    COVID-19 (MODERNA) 1st,2nd,3rd Dose Monovalent 02/01/2020, 03/25/2021, 04/30/2021    Flublok 18+yrs 10/10/2022    Fluzone (or Fluarix & Flulaval for VFC) >6mos 10/01/2018, 10/01/2020    Fluzone Quad >6mos (Multi-dose) 10/01/2018    Hepatitis A 10/01/2018    INFLUENZA NASAL UF 01/01/2022    Influenza MDCK Quadrivalent with Preserative 10/21/2021       Review of Systems   Constitutional: Negative.    HENT: Negative.     Eyes: Negative.    Respiratory: Negative.     Cardiovascular: Negative.    Gastrointestinal: Negative.    Endocrine: Negative for polydipsia, polyphagia and polyuria.   Genitourinary: Negative.    Musculoskeletal: Negative.    Skin: Negative.    Neurological: Negative.    Psychiatric/Behavioral: Negative.         Past History:  Medical History: has a past medical history of Hypertension, Left shoulder pain, and Palpitations.   Surgical History: has a past surgical history that includes Lipoma Excision (2016); Uterine fibroid surgery; ORIF finger / thumb fracture (2004); and Shoulder arthroscopy (Left, 3/6/2018).   Family History: family history includes Diabetes in her mother; Hypertension in her father and mother.   Social History: reports that she has never smoked. She does not have any smokeless tobacco history on file. She reports current alcohol use. She reports that she does not use drugs.      Current Outpatient Medications:     amLODIPine (NORVASC) 5 MG tablet, Take 1 tablet by mouth Daily., Disp: 90 tablet, Rfl: 3    hydroCHLOROthiazide 25 MG tablet, Take 1 tablet by mouth Daily., Disp: 90 tablet, Rfl: 3    VITAMIN D PO, Take 3,000 Units by mouth., Disp: , Rfl:    Allergies:  Amoxicillin-pot clavulanate    Physical Exam  Constitutional:       Appearance: Normal appearance.   HENT:      Head: Normocephalic.      Right Ear: Tympanic membrane, ear canal and external ear normal.      Left Ear: Tympanic membrane, ear canal and external ear normal.      Nose: Nose normal.      Mouth/Throat:      Mouth: Mucous membranes are moist.      Pharynx: Oropharynx is clear.   Eyes:      Extraocular Movements: Extraocular movements intact.      Conjunctiva/sclera: Conjunctivae normal.      Pupils: Pupils are equal, round, and reactive to light.   Cardiovascular:      Rate and Rhythm: Normal rate and regular rhythm.      Heart sounds: Normal heart sounds.   Pulmonary:      Effort: Pulmonary effort is normal.      Breath sounds: Normal breath sounds.   Abdominal:      General: Abdomen is flat. Bowel sounds are normal.      Palpations: Abdomen is soft.   Genitourinary:     Comments: Denied   Musculoskeletal:         General: Normal range of motion.      Cervical back: Normal range of motion.   Skin:     General: Skin is warm.   Neurological:      General: No focal deficit present.      Mental Status: She is alert and oriented to person, place, and time. Mental status is at baseline.   Psychiatric:         Mood and Affect: Mood normal.         Behavior: Behavior normal.         Thought Content: Thought content normal.         Judgment: Judgment normal.          Result Review :                     Assessment and Plan    Diagnoses and all orders for this visit:    1. Annual physical exam (Primary)  Assessment & Plan:  Fasting labs drawn.  UA completed.  Blood pressure discussed.  PHQ-9 completed and discussed.  No thoughts of suicide or hurting herself or anyone else.  Proper diet and exercise plan discussed and encouraged.  Annual dental and eye exams encouraged.  Will schedule mammogram.  Will schedule colonoscopy.  She states she [facility] with [facility] and that she is up-to-date on  Pap smears through them.  Tdap vaccine given today in clinic.  Vaccine information sheet given.  Risk discussed and understood.  Patient tolerated well.  Risk of meds discussed and understood.  Education provided.  We discussed all immunizations and she denies shingles and COVID vaccines.  Risk discussed and understood.  Return to clinic or ED with any issues or concerns.    Orders:  -     CBC & Differential  -     Comprehensive Metabolic Panel  -     TSH Rfx On Abnormal To Free T4  -     POC Urinalysis Dipstick, Automated; Future    2. Benign essential HTN  Assessment & Plan:  Blood pressure borderline elevated today in clinic.  She will continue with current medications but she will continue to check her blood pressure at home.  Goal blood pressure less than 140/90.  She will let me know if blood pressure does not come down to below the goal.  Risk of meds discussed and understood.  Patient is starting a new job next week so she is hoping to only have to do this appointment once yearly.  She states she will keep contact with me that regarding blood pressure but does not come down.  Education provided.  Return to clinic or ED with any issues or concerns.    Orders:  -     amLODIPine (NORVASC) 5 MG tablet; Take 1 tablet by mouth Daily.  Dispense: 90 tablet; Refill: 3  -     hydroCHLOROthiazide 25 MG tablet; Take 1 tablet by mouth Daily.  Dispense: 90 tablet; Refill: 3    3. Screening cholesterol level  -     Lipid Panel    4. Screening for colon cancer  -     Ambulatory Referral For Screening Colonoscopy    5. Screening for cervical cancer    6. Encounter for screening mammogram for malignant neoplasm of breast  -     Mammo Screening Digital Tomosynthesis Bilateral With CAD; Future    7. Need for hepatitis C screening test  -     Hepatitis C Antibody    8. Encounter for vitamin deficiency screening  -     Vitamin D,25-Hydroxy  -     Vitamin B12    9. Immunization due  -     Tdap Vaccine => 6yo IM (BOOSTRIX);  Future                       Follow Up   No follow-ups on file.  Patient was given instructions and counseling regarding her condition or for health maintenance advice. Please see specific information pulled into the AVS if appropriate.     MARCIE Stacy

## 2024-06-06 NOTE — PATIENT INSTRUCTIONS
Obesity, Adult  Obesity is the condition of having too much total body fat. Being overweight or obese means that your weight is greater than what is considered healthy for your body size. Obesity is determined by a measurement called BMI (body mass index). BMI is an estimate of body fat and is calculated from height and weight. For adults, a BMI of 30 or higher is considered obese.  Obesity can lead to other health concerns and major illnesses, including:  Stroke.  Coronary artery disease (CAD).  Type 2 diabetes.  Some types of cancer, including cancers of the colon, breast, uterus, and gallbladder.  High blood pressure (hypertension).  High cholesterol.  Gallbladder stones.  Obesity can also contribute to:  Osteoarthritis.  Sleep apnea.  Infertility problems.  What are the causes?  Common causes of this condition include:  Eating daily meals that are high in calories, sugar, and fat.  Drinking high amounts of sugar-sweetened beverages, such as soft drinks.  Being born with genes that may make you more likely to become obese.  Having a medical condition that causes obesity, including:  Hypothyroidism.  Polycystic ovarian syndrome (PCOS).  Binge-eating disorder.  Cushing syndrome.  Taking certain medicines, such as steroids, antidepressants, and seizure medicines.  Not being physically active (sedentary lifestyle).  Not getting enough sleep.  What increases the risk?  The following factors may make you more likely to develop this condition:  Having a family history of obesity.  Living in an area with limited access to:  [place], recreation centers, or sidewalks.  Healthy food choices, such as grocery stores and [employer] markets.  What are the signs or symptoms?  The main sign of this condition is having too much body fat.  How is this diagnosed?  This condition is diagnosed based on:  Your BMI. If you are an adult with a BMI of 30 or higher, you are considered obese.  Your waist circumference. This measures the  distance around your waistline.  Your skinfold thickness. Your health care provider may gently pinch a fold of your skin and measure it.  You may have other tests to check for underlying conditions.  How is this treated?  Treatment for this condition often includes changing your lifestyle. Treatment may include some or all of the following:  Dietary changes. This may include developing a healthy meal plan.  Regular physical activity. This may include activity that causes your heart to beat faster (aerobic exercise) and strength training. Work with your health care provider to design an exercise program that works for you.  Medicine to help you lose weight if you are unable to lose one pound a week after six weeks of healthy eating and more physical activity.  Treating conditions that cause the obesity (underlying conditions).  Surgery. Surgical options may include gastric banding and gastric bypass. Surgery may be done if:  Other treatments have not helped to improve your condition.  You have a BMI of 40 or higher.  You have life-threatening health problems related to obesity.  Follow these instructions at home:  Eating and drinking    Follow recommendations from your health care provider about what you eat and drink. Your health care provider may advise you to:  Limit fast food, sweets, and processed snack foods.  Choose low-fat options, such as low-fat milk instead of whole milk.  Eat five or more servings of fruits or vegetables every day.  Choose healthy foods when you eat out.  Keep low-fat snacks available.  Limit sugary drinks, such as soda, fruit juice, sweetened iced tea, and flavored milk.  Drink enough water to keep your urine pale yellow.  Do not follow a fad diet. Fad diets can be unhealthy and even dangerous.  Other healthful choices include:  Eat at home more often. This gives you more control over what you eat.  Learn to read food labels. This will help you understand how much food is considered one  serving.  Learn what a healthy serving size is.  Physical activity  Exercise regularly, as told by your health care provider.  Most adults should get up to 150 minutes of moderate-intensity exercise every week.  Ask your health care provider what types of exercise are safe for you and how often you should exercise.  Warm up and stretch before being active.  Cool down and stretch after being active.  Rest between periods of activity.  Lifestyle  Work with your health care provider and a dietitian to set a weight-loss goal that is healthy and reasonable for you.  Limit your screen time.  Find ways to reward yourself that do not involve food.  Do not drink alcohol if:  Your health care provider tells you not to drink.  You are pregnant, may be pregnant, or are planning to become pregnant.  If you drink alcohol:  Limit how much you have to:  0-1 drink a day for women.  0-2 drinks a day for men.  Know how much alcohol is in your drink. In the [place] one drink equals one 12 oz bottle of beer (355 mL), one 5 oz glass of wine (148 mL), or one 1½ oz glass of hard liquor (44 mL).  General instructions  Keep a weight-loss journal to keep track of the food you eat and how much exercise you get.  Take over-the-counter and prescription medicines only as told by your health care provider.  Take vitamins and supplements only as told by your health care provider.  Consider joining a support group. Your health care provider may be able to recommend a support group.  Pay attention to your mental health as obesity can lead to depression or self esteem issues.  Keep all follow-up visits. This is important.  Contact a health care provider if:  You are unable to meet your weight-loss goal after six weeks of dietary and lifestyle changes.  You have trouble breathing.  Summary  Obesity is the condition of having too much total body fat.  Being overweight or obese means that your weight is greater than what is considered healthy for your body  size.  Work with your health care provider and a dietitian to set a weight-loss goal that is healthy and reasonable for you.  Exercise regularly, as told by your health care provider. Ask your health care provider what types of exercise are safe for you and how often you should exercise.  This information is not intended to replace advice given to you by your health care provider. Make sure you discuss any questions you have with your health care provider.  Document Revised: 07/26/2022 Document Reviewed: 07/26/2022  [employer] Patient Education © 2024 [employer]    Exercising to Lose Weight  Getting regular exercise is important for everyone. It is especially important if you are overweight. Being overweight increases your risk of heart disease, stroke, diabetes, high blood pressure, and several types of cancer. Exercising, and reducing the calories you consume, can help you lose weight and improve fitness and health.  Exercise can be moderate or vigorous intensity. To lose weight, most people need to do a certain amount of moderate or vigorous-intensity exercise each week.  How can exercise affect me?  You lose weight when you exercise enough to burn more calories than you eat. Exercise also reduces body fat and builds muscle. The more muscle you have, the more calories you burn. Exercise also:  Improves mood.  Reduces stress and tension.  Improves your overall fitness, flexibility, and endurance.  Increases bone strength.  Moderate-intensity exercise    Moderate-intensity exercise is any activity that gets you moving enough to burn at least three times more energy (calories) than if you were sitting.  Examples of moderate exercise include:  Walking a mile in 15 minutes.  Doing light yard work.  Biking at an easy pace.  Most people should get at least 150 minutes of moderate-intensity exercise a week to maintain their body weight.  Vigorous-intensity exercise  Vigorous-intensity exercise is any activity that gets  you moving enough to burn at least six times more calories than if you were sitting. When you exercise at this intensity, you should be working hard enough that you are not able to carry on a conversation.  Examples of vigorous exercise include:  Running.  Playing a team sport, such as football, basketball, and soccer.  Jumping rope.  Most people should get at least 75 minutes a week of vigorous exercise to maintain their body weight.  What actions can I take to lose weight?  The amount of exercise you need to lose weight depends on:  Your age.  The type of exercise.  Any health conditions you have.  Your overall physical ability.  Talk to your health care provider about how much exercise you need and what types of activities are safe for you.  Nutrition    Make changes to your diet as told by your health care provider or diet and nutrition specialist (dietitian). This may include:  Eating fewer calories.  Eating more protein.  Eating less unhealthy fats.  Eating a diet that includes fresh fruits and vegetables, whole grains, low-fat dairy products, and lean protein.  Avoiding foods with added fat, salt, and sugar.  Drink plenty of water while you exercise to prevent dehydration or heat stroke.  Activity  Choose an activity that you enjoy and set realistic goals. Your health care provider can help you make an exercise plan that works for you.  Exercise at a moderate or vigorous intensity most days of the week.  The intensity of exercise may vary from person to person. You can tell how intense a workout is for you by paying attention to your breathing and heartbeat. Most people will notice their breathing and heartbeat get faster with more intense exercise.  Do resistance training twice each week, such as:  Push-ups.  Sit-ups.  Lifting weights.  Using resistance bands.  Getting short amounts of exercise can be just as helpful as long, structured periods of exercise. If you have trouble finding time to exercise, try  doing these things as part of your daily routine:  Get up, stretch, and walk around every 30 minutes throughout the day.  Go for a walk during your lunch break.  Park your car farther away from your destination.  If you take public transportation, get off one stop early and walk the rest of the way.  Make phone calls while standing up and walking around.  Take the stairs instead of elevators or escalators.  Wear comfortable clothes and shoes with good support.  Do not exercise so much that you hurt yourself, feel dizzy, or get very short of breath.  Where to find more information  [place] Department of Health and Human Services: www.hhs.gov  Centers for Disease Control and Prevention: www.cdc.gov  Contact a health care provider:  Before starting a new exercise program.  If you have questions or concerns about your weight.  If you have a medical problem that keeps you from exercising.  Get help right away if:  You have any of the following while exercising:  Injury.  Dizziness.  Difficulty breathing or shortness of breath that does not go away when you stop exercising.  Chest pain.  Rapid heartbeat.  These symptoms may represent a serious problem that is an emergency. Do not wait to see if the symptoms will go away. Get medical help right away. Call your local emergency services (911 in the [place] Do not drive yourself to the hospital.  Summary  Getting regular exercise is especially important if you are overweight.  Being overweight increases your risk of heart disease, stroke, diabetes, high blood pressure, and several types of cancer.  Losing weight happens when you burn more calories than you eat.  Reducing the amount of calories you eat, and getting regular moderate or vigorous exercise each week, helps you lose weight.  This information is not intended to replace advice given to you by your health care provider. Make sure you discuss any questions you have with your health care provider.  Document Revised:  02/13/2022 Document Reviewed: 02/13/2022  [employer] Patient Education © 2024 [employer]    MyPlate from [employer]    MyPlate is an outline of a general healthy diet based on the Dietary Guidelines for Americans, 0438-1072, from the [place] Department of Agriculture (USDA). It sets guidelines for how much food you should eat from each food group based on your age, sex, and level of physical activity.  What are tips for following MyPlate?  To follow MyPlate recommendations:  Eat a wide variety of fruits and vegetables, grains, and protein foods.  Serve smaller portions and eat less food throughout the day.  Limit portion sizes to avoid overeating.  Enjoy your food.  Get at least 150 minutes of exercise every week. This is about 30 minutes each day, 5 or more days per week.  It can be difficult to have every meal look like MyPlate. Think about MyPlate as eating guidelines for an entire day, rather than each individual meal.  Fruits and vegetables  Make one half of your plate fruits and vegetables.  Eat many different colors of fruits and vegetables each day.  For a 2,000-calorie daily food plan, eat:  2½ cups of vegetables every day.  2 cups of fruit every day.  1 cup is equal to:  1 cup raw or cooked vegetables.  1 cup raw fruit.  1 medium-sized orange, apple, or banana.  1 cup 100% fruit or vegetable juice.  2 cups raw leafy greens, such as lettuce, spinach, or kale.  ½ cup dried fruit.  Grains  One fourth of your plate should be grains.  Make at least half of the grains you eat each day whole grains.  For a 2,000-calorie daily food plan, eat 6 oz of grains every day.  1 oz is equal to:  1 slice bread.  1 cup cereal.  ½ cup cooked rice, cereal, or pasta.  Protein  One fourth of your plate should be protein.  Eat a wide variety of protein foods, including meat, poultry, fish, eggs, beans, nuts, and tofu.  For a 2,000-calorie daily food plan, eat 5½ oz of protein every day.  1 oz is equal to:  1 oz meat, poultry, or fish.  ¼  cup cooked beans.  1 egg.  ½ oz nuts or seeds.  1 Tbsp peanut butter.  Dairy  Drink fat-free or low-fat (1%) milk.  Eat or drink dairy as a side to meals.  For a 2,000-calorie daily food plan, eat or drink 3 cups of dairy every day.  1 cup is equal to:  1 cup milk, yogurt, cottage cheese, or soy milk (soy beverage).  2 oz processed cheese.  1½ oz natural cheese.  Fats, oils, salt, and sugars  Only small amounts of oils are recommended.  Avoid foods that are high in calories and low in nutritional value (empty calories), like foods high in fat or added sugars.  Choose foods that are low in salt (sodium). Choose foods that have less than 140 milligrams (mg) of sodium per serving.  Drink water instead of sugary drinks. Drink enough fluid to keep your urine pale yellow.  Where to find support  Work with your health care provider or a dietitian to develop a customized eating plan that is right for you.  Download an atiya (mobile application) to help you track your daily food intake.  Where to find more information  USDA: ChooseMyPlate.gov  Summary  MyPlate is a general guideline for healthy eating from the USDA. It is based on the Dietary Guidelines for Americans, 8538-6759.  In general, fruits and vegetables should take up one half of your plate, grains should take up one fourth of your plate, and protein should take up one fourth of your plate.  This information is not intended to replace advice given to you by your health care provider. Make sure you discuss any questions you have with your health care provider.  Document Revised: 11/08/2021 Document Reviewed: 11/08/2021  [employer] Patient Education © 2024 [employer]

## 2024-06-06 NOTE — ASSESSMENT & PLAN NOTE
Fasting labs drawn.  UA completed.  Blood pressure discussed.  PHQ-9 completed and discussed.  No thoughts of suicide or hurting herself or anyone else.  Proper diet and exercise plan discussed and encouraged.  Annual dental and eye exams encouraged.  Will schedule mammogram.  Will schedule colonoscopy.  She states she follows up with [facility] and that she is up-to-date on Pap smears through them.  Tdap vaccine given today in clinic.  Vaccine information sheet given.  Risk discussed and understood.  Patient tolerated well.  Risk of meds discussed and understood.  Education provided.  We discussed all immunizations and she denies shingles and COVID vaccines.  Risk discussed and understood.  Return to clinic or ED with any issues or concerns.

## 2024-06-06 NOTE — ASSESSMENT & PLAN NOTE
Blood pressure borderline elevated today in clinic.  She will continue with current medications but she will continue to check her blood pressure at home.  Goal blood pressure less than 140/90.  She will let me know if blood pressure does not come down to below the goal.  Risk of meds discussed and understood.  Patient is starting a new job next week so she is hoping to only have to do this appointment once yearly.  She states she will keep contact with me that regarding blood pressure but does not come down.  Education provided.  Return to clinic or ED with any issues or concerns.

## 2024-06-07 LAB
25(OH)D3+25(OH)D2 SERPL-MCNC: 75.7 NG/ML (ref 30–100)
ALBUMIN SERPL-MCNC: 4.6 G/DL (ref 3.8–4.9)
ALBUMIN/GLOB SERPL: 1.7 {RATIO} (ref 1.2–2.2)
ALP SERPL-CCNC: 88 IU/L (ref 44–121)
ALT SERPL-CCNC: 22 IU/L (ref 0–32)
AST SERPL-CCNC: 27 IU/L (ref 0–40)
BASOPHILS # BLD AUTO: 0 X10E3/UL (ref 0–0.2)
BASOPHILS NFR BLD AUTO: 1 %
BILIRUB SERPL-MCNC: 0.7 MG/DL (ref 0–1.2)
BUN SERPL-MCNC: 22 MG/DL (ref 6–24)
BUN/CREAT SERPL: 28 (ref 9–23)
CALCIUM SERPL-MCNC: 9.7 MG/DL (ref 8.7–10.2)
CHLORIDE SERPL-SCNC: 99 MMOL/L (ref 96–106)
CHOLEST SERPL-MCNC: 180 MG/DL (ref 100–199)
CO2 SERPL-SCNC: 25 MMOL/L (ref 20–29)
CREAT SERPL-MCNC: 0.8 MG/DL (ref 0.57–1)
EGFRCR SERPLBLD CKD-EPI 2021: 87 ML/MIN/1.73
EOSINOPHIL # BLD AUTO: 0.1 X10E3/UL (ref 0–0.4)
EOSINOPHIL NFR BLD AUTO: 4 %
ERYTHROCYTE [DISTWIDTH] IN BLOOD BY AUTOMATED COUNT: 13.3 % (ref 11.7–15.4)
GLOBULIN SER CALC-MCNC: 2.7 G/DL (ref 1.5–4.5)
GLUCOSE SERPL-MCNC: 180 MG/DL (ref 70–99)
HCT VFR BLD AUTO: 40.8 % (ref 34–46.6)
HCV IGG SERPL QL IA: NON REACTIVE
HDLC SERPL-MCNC: 91 MG/DL
HGB BLD-MCNC: 13.1 G/DL (ref 11.1–15.9)
IMM GRANULOCYTES # BLD AUTO: 0 X10E3/UL (ref 0–0.1)
IMM GRANULOCYTES NFR BLD AUTO: 0 %
LDLC SERPL CALC-MCNC: 80 MG/DL (ref 0–99)
LYMPHOCYTES # BLD AUTO: 0.9 X10E3/UL (ref 0.7–3.1)
LYMPHOCYTES NFR BLD AUTO: 30 %
MCH RBC QN AUTO: 27.8 PG (ref 26.6–33)
MCHC RBC AUTO-ENTMCNC: 32.1 G/DL (ref 31.5–35.7)
MCV RBC AUTO: 87 FL (ref 79–97)
MONOCYTES # BLD AUTO: 0.3 X10E3/UL (ref 0.1–0.9)
MONOCYTES NFR BLD AUTO: 9 %
NEUTROPHILS # BLD AUTO: 1.7 X10E3/UL (ref 1.4–7)
NEUTROPHILS NFR BLD AUTO: 56 %
PLATELET # BLD AUTO: 284 X10E3/UL (ref 150–450)
POTASSIUM SERPL-SCNC: 3.6 MMOL/L (ref 3.5–5.2)
PROT SERPL-MCNC: 7.3 G/DL (ref 6–8.5)
RBC # BLD AUTO: 4.71 X10E6/UL (ref 3.77–5.28)
SODIUM SERPL-SCNC: 138 MMOL/L (ref 134–144)
TRIGL SERPL-MCNC: 42 MG/DL (ref 0–149)
TSH SERPL DL<=0.005 MIU/L-ACNC: 0.97 UIU/ML (ref 0.45–4.5)
VIT B12 SERPL-MCNC: 957 PG/ML (ref 232–1245)
VLDLC SERPL CALC-MCNC: 9 MG/DL (ref 5–40)
WBC # BLD AUTO: 3 X10E3/UL (ref 3.4–10.8)

## 2024-06-12 LAB
HBA1C MFR BLD: 9.2 % (ref 4.8–5.6)
WRITTEN AUTHORIZATION: NORMAL

## 2024-06-27 ENCOUNTER — LAB (OUTPATIENT)
Dept: FAMILY MEDICINE CLINIC | Facility: CLINIC | Age: 54
End: 2024-06-27
Payer: MEDICAID

## 2024-06-27 DIAGNOSIS — D72.819 LEUKOPENIA, UNSPECIFIED TYPE: Primary | ICD-10-CM

## 2024-06-28 ENCOUNTER — TELEPHONE (OUTPATIENT)
Dept: FAMILY MEDICINE CLINIC | Facility: CLINIC | Age: 54
End: 2024-06-28
Payer: MEDICAID

## 2024-06-28 DIAGNOSIS — D72.819 LEUKOPENIA, UNSPECIFIED TYPE: Primary | ICD-10-CM

## 2024-06-28 LAB
BASOPHILS # BLD AUTO: 0 X10E3/UL (ref 0–0.2)
BASOPHILS NFR BLD AUTO: 1 %
EOSINOPHIL # BLD AUTO: 0.1 X10E3/UL (ref 0–0.4)
EOSINOPHIL NFR BLD AUTO: 3 %
ERYTHROCYTE [DISTWIDTH] IN BLOOD BY AUTOMATED COUNT: 13.6 % (ref 11.7–15.4)
HCT VFR BLD AUTO: 35.3 % (ref 34–46.6)
HGB BLD-MCNC: 11.8 G/DL (ref 11.1–15.9)
IMM GRANULOCYTES # BLD AUTO: 0 X10E3/UL (ref 0–0.1)
IMM GRANULOCYTES NFR BLD AUTO: 0 %
LYMPHOCYTES # BLD AUTO: 1.1 X10E3/UL (ref 0.7–3.1)
LYMPHOCYTES NFR BLD AUTO: 36 %
MCH RBC QN AUTO: 28.7 PG (ref 26.6–33)
MCHC RBC AUTO-ENTMCNC: 33.4 G/DL (ref 31.5–35.7)
MCV RBC AUTO: 86 FL (ref 79–97)
MONOCYTES # BLD AUTO: 0.3 X10E3/UL (ref 0.1–0.9)
MONOCYTES NFR BLD AUTO: 10 %
NEUTROPHILS # BLD AUTO: 1.5 X10E3/UL (ref 1.4–7)
NEUTROPHILS NFR BLD AUTO: 50 %
PLATELET # BLD AUTO: 284 X10E3/UL (ref 150–450)
RBC # BLD AUTO: 4.11 X10E6/UL (ref 3.77–5.28)
WBC # BLD AUTO: 3.1 X10E3/UL (ref 3.4–10.8)

## 2024-06-28 NOTE — TELEPHONE ENCOUNTER
Caller: Sherrie Harris    Relationship: Self    Best call back number: [phone]       What was the call regarding: SPOKE TO SOMEONE EARLIER ABOUT REFERRAL TO HEMOTOLOGIST AND THEY WERE MAKING THE APPOINTMENT IN [place]. PATIENT ASKS THAT WE TRY TO GET IT IN [place] IF POSSIBLE

## 2024-08-20 ENCOUNTER — CONSULT (OUTPATIENT)
Dept: ONCOLOGY | Facility: CLINIC | Age: 54
End: 2024-08-20
Payer: MEDICAID

## 2024-08-20 VITALS
HEIGHT: 69 IN | SYSTOLIC BLOOD PRESSURE: 146 MMHG | BODY MASS INDEX: 24.44 KG/M2 | HEART RATE: 79 BPM | WEIGHT: 165 LBS | RESPIRATION RATE: 18 BRPM | TEMPERATURE: 97.2 F | OXYGEN SATURATION: 96 % | DIASTOLIC BLOOD PRESSURE: 85 MMHG

## 2024-08-20 DIAGNOSIS — D72.818 OTHER DECREASED WHITE BLOOD CELL (WBC) COUNT: Primary | ICD-10-CM

## 2024-08-20 PROBLEM — D72.819 LEUCOPENIA: Status: ACTIVE | Noted: 2024-08-20

## 2024-08-20 PROCEDURE — 1160F RVW MEDS BY RX/DR IN RCRD: CPT | Performed by: INTERNAL MEDICINE

## 2024-08-20 PROCEDURE — 1159F MED LIST DOCD IN RCRD: CPT | Performed by: INTERNAL MEDICINE

## 2024-08-20 PROCEDURE — 3077F SYST BP >= 140 MM HG: CPT | Performed by: INTERNAL MEDICINE

## 2024-08-20 PROCEDURE — 99205 OFFICE O/P NEW HI 60 MIN: CPT | Performed by: INTERNAL MEDICINE

## 2024-08-20 PROCEDURE — 1126F AMNT PAIN NOTED NONE PRSNT: CPT | Performed by: INTERNAL MEDICINE

## 2024-08-20 PROCEDURE — 3079F DIAST BP 80-89 MM HG: CPT | Performed by: INTERNAL MEDICINE

## 2024-08-20 NOTE — LETTER
August 20, 2024       No Recipients    Patient: Sherrie Harris   YOB: 1970   Date of Visit: 8/20/2024     Dear MARCIE Stacy:       Thank you for referring Sherrie Harris to me for evaluation. Below are the relevant portions of my assessment and plan of care.    If you have questions, please do not hesitate to call me. I look forward to following Sherrie along with you.         Sincerely,        Javad Escobar MD        CC:   No Recipients    Javad Escobar MD  08/20/24 0800  Sign when Signing Visit  CHIEF COMPLAINT: No somatic complaints    REASON FOR REFERRAL: [facility]      RECORDS OBTAINED  Records of the patients history including those obtained from primary care were reviewed and summarized in detail.    Oncology/Hematology History Overview Note   1.  Leukopenia  2.  Diabetes  3.  Hypertension  4.  Left shoulder repair 3/6/2018      Hematology history timeline:  -2/28/2018 white count 4360 lower limit of normal 4500 otherwise unremarkable differential and CBC  -12/21/2021 CBC normal including white count 4020.  -10/5/2023 CBC normal including white count 3870.  -6/6/2024 white count 3000 with otherwise unremarkable CBC and differential  -6/27/2024 white count 3100 with otherwise unremarkable CBC and differential.    -8/20/2024 [facility] hematology consult: Until about 3 years ago the patient was on ACE inhibitor for blood pressure but being -American and that not being the best drug for that situation, she was switched to amlodipine and hydrochlorothiazide.  Thiazide diuretics are on the list of culprits for neutropenia.  Amlodipine has this listed but probably 1% or less.  The magnitude of her leukopenia is not so low as to recommend cessation of either of these medications as blood pressure control is important and clinically the levels of leukopenia should be clinically irrelevant.  I will check copper, B12, folate, HIV, EBV, hepatitis ABC for completeness sake.  We will check  peripheral smear.  If this is persistent I will check peripheral blood flow cytometry and T-cell gene rearrangement along with MISTY and CCP but she has no stigmata for autoimmune disease.  May Null neutropenia formerly known as benign ethnic neutropenia is very common in her demographic but that should have been present dating back to 2018 and it only presented in the last 3 years.     Leucopenia       HISTORY OF PRESENT ILLNESS:  The patient is a 53 y.o.  female, referred for leukopenia without frequent infections starting 3 years ago approximately the time she was switched from lisinopril to amlodipine and hydrochlorothiazide.    REVIEW OF SYSTEMS:  No rashes or arthritis.    Past Medical History:   Diagnosis Date   • Hypertension    • Left shoulder pain    • Palpitations      Past Surgical History:   Procedure Laterality Date   • LIPOMA EXCISION  2016    LEFT SHOULDER   • ORIF FINGER / THUMB FRACTURE  2004    LEFT THUMB    • SHOULDER ARTHROSCOPY Left 3/6/2018    Procedure: LEFT SHOULDER ARTHROSCOPY,  SUBACROMIAL DECOMPRESSION,  DISTAL CLAVICLE EXCISION,  BICEPS TENOTOMY, and LABRAL DEBRIDEMENT;  Surgeon: TITUS Huntley MD;  Location: [facility] OR [facility] [place];  Service:    • UTERINE FIBROID SURGERY         Current Outpatient Medications on File Prior to Visit   Medication Sig Dispense Refill   • amLODIPine (NORVASC) 5 MG tablet Take 1 tablet by mouth Daily. 90 tablet 3   • hydroCHLOROthiazide 25 MG tablet Take 1 tablet by mouth Daily. 90 tablet 3   • VITAMIN D PO Take 3,000 Units by mouth.       No current facility-administered medications on file prior to visit.       Allergies   Allergen Reactions   • Amoxicillin-Pot Clavulanate Hives       Social History     Socioeconomic History   • Marital status: Single   Tobacco Use   • Smoking status: Never   Vaping Use   • Vaping status: Never Used   Substance and Sexual Activity   • Alcohol use: Yes     Comment: OCCASIONAL   • Drug use: No   • Sexual activity: Defer       Family  "History   Problem Relation Age of Onset   • Diabetes Mother    • Hypertension Mother    • Hypertension Father    • Malig Hyperthermia Neg Hx        PHYSICAL EXAM:  No palpable cervical axillary or inguinal adenopathy.  No palpable hepatosplenomegaly.  No rashes.  No Waldeyer's ring enlargement.  No respiratory distress or wheezes.    /85   Pulse 79   Temp 97.2 °F (36.2 °C)   Resp 18   Ht 175.3 cm (69\")   Wt 74.8 kg (165 lb)   SpO2 96%   BMI 24.37 kg/m²     ECOG score: 0           ECOG: (0) Fully Active - Able to Carry On All Pre-disease Performance Without Restriction    Lab Results   Component Value Date    HGB 11.8 06/27/2024    HCT 35.3 06/27/2024    MCV 86 06/27/2024     06/27/2024    WBC 3.1 (L) 06/27/2024    NEUTROABS 1.5 06/27/2024    LYMPHSABS 1.1 06/27/2024    MONOSABS 0.3 06/27/2024    EOSABS 0.1 06/27/2024    BASOSABS 0.0 06/27/2024     Lab Results   Component Value Date    GLUCOSE 180 (H) 06/06/2024    BUN 22 06/06/2024    CREATININE 0.80 06/06/2024     06/06/2024    K 3.6 06/06/2024    CL 99 06/06/2024    CO2 25 06/06/2024    CALCIUM 9.7 06/06/2024    PROTEINTOT 7.5 10/05/2023    ALBUMIN 4.6 06/06/2024    BILITOT 0.7 06/06/2024    ALKPHOS 88 06/06/2024    AST 27 06/06/2024    ALT 22 06/06/2024         Assessment & Plan  1.  Leukopenia  2.  Diabetes  3.  Hypertension  4.  Left shoulder repair 3/6/2018      Hematology history timeline:  -2/28/2018 white count 4360 lower limit of normal 4500 otherwise unremarkable differential and CBC  -12/21/2021 CBC normal including white count 4020.  -10/5/2023 CBC normal including white count 3870.  -6/6/2024 white count 3000 with otherwise unremarkable CBC and differential  -6/27/2024 white count 3100 with otherwise unremarkable CBC and differential.    -8/20/2024 [facility] hematology consult: Until about 3 years ago the patient was on ACE inhibitor for blood pressure but being -American and that not being the best drug for that " situation, she was switched to amlodipine and hydrochlorothiazide.  Thiazide diuretics are on the list of culprits for neutropenia.  Amlodipine has this listed but probably 1% or less.  The magnitude of her leukopenia is not so low as to recommend cessation of either of these medications as blood pressure control is important and clinically the levels of leukopenia should be clinically irrelevant.  I will check copper, B12, folate, HIV, EBV, hepatitis ABC for completeness sake.  We will check peripheral smear.  If this is persistent I will check peripheral blood flow cytometry and T-cell gene rearrangement along with MISTY and CCP but she has no stigmata for autoimmune disease.  May Null neutropenia formerly known as benign ethnic neutropenia is very common in her demographic but that should have been present dating back to 2018 and it only presented in the last 3 years.    Total time of care today inclusive of time spent today prior to patient's arrival reviewing past records and interviewing her as to signs or symptoms of her disease and management thereof and after visit instituting this plan took 1 hour patient care time throughout the day today.      Javad Escobar MD    8/20/2024

## 2024-08-20 NOTE — PROGRESS NOTES
CHIEF COMPLAINT: No somatic complaints    REASON FOR REFERRAL: [facility]egas      RECORDS OBTAINED  Records of the patients history including those obtained from primary care were reviewed and summarized in detail.    Oncology/Hematology History Overview Note   1.  Leukopenia  2.  Diabetes  3.  Hypertension  4.  Left shoulder repair 3/6/2018      Hematology history timeline:  -2/28/2018 white count 4360 lower limit of normal 4500 otherwise unremarkable differential and CBC  -12/21/2021 CBC normal including white count 4020.  -10/5/2023 CBC normal including white count 3870.  -6/6/2024 white count 3000 with otherwise unremarkable CBC and differential  -6/27/2024 white count 3100 with otherwise unremarkable CBC and differential.    -8/20/2024 [facility] hematology consult: Until about 3 years ago the patient was on ACE inhibitor for blood pressure but being -American and that not being the best drug for that situation, she was switched to amlodipine and hydrochlorothiazide.  Thiazide diuretics are on the list of culprits for neutropenia.  Amlodipine has this listed but probably 1% or less.  The magnitude of her leukopenia is not so low as to recommend cessation of either of these medications as blood pressure control is important and clinically the levels of leukopenia should be clinically irrelevant.  I will check copper, B12, folate, HIV, EBV, hepatitis ABC for completeness sake.  We will check peripheral smear.  If this is persistent I will check peripheral blood flow cytometry and T-cell gene rearrangement along with MISTY and CCP but she has no stigmata for autoimmune disease.  May Null neutropenia formerly known as benign ethnic neutropenia is very common in her demographic but that should have been present dating back to 2018 and it only presented in the last 3 years.     Leucopenia       HISTORY OF PRESENT ILLNESS:  The patient is a 53 y.o.  female, referred for leukopenia without frequent infections starting 3  "years ago approximately the time she was switched from lisinopril to amlodipine and hydrochlorothiazide.    REVIEW OF SYSTEMS:  No rashes or arthritis.    Past Medical History:   Diagnosis Date    Hypertension     Left shoulder pain     Palpitations      Past Surgical History:   Procedure Laterality Date    LIPOMA EXCISION  2016    LEFT SHOULDER    ORIF FINGER / THUMB FRACTURE  2004    LEFT THUMB     SHOULDER ARTHROSCOPY Left 3/6/2018    Procedure: LEFT SHOULDER ARTHROSCOPY,  SUBACROMIAL DECOMPRESSION,  DISTAL CLAVICLE EXCISION,  BICEPS TENOTOMY, and LABRAL DEBRIDEMENT;  Surgeon: TITUS Huntley MD;  Location: [facility] OR [facility] [place];  Service:     UTERINE FIBROID SURGERY         Current Outpatient Medications on File Prior to Visit   Medication Sig Dispense Refill    amLODIPine (NORVASC) 5 MG tablet Take 1 tablet by mouth Daily. 90 tablet 3    hydroCHLOROthiazide 25 MG tablet Take 1 tablet by mouth Daily. 90 tablet 3    VITAMIN D PO Take 3,000 Units by mouth.       No current facility-administered medications on file prior to visit.       Allergies   Allergen Reactions    Amoxicillin-Pot Clavulanate Hives       Social History     Socioeconomic History    Marital status: Single   Tobacco Use    Smoking status: Never   Vaping Use    Vaping status: Never Used   Substance and Sexual Activity    Alcohol use: Yes     Comment: OCCASIONAL    Drug use: No    Sexual activity: Defer       Family History   Problem Relation Age of Onset    Diabetes Mother     Hypertension Mother     Hypertension Father     Malig Hyperthermia Neg Hx        PHYSICAL EXAM:  No palpable cervical axillary or inguinal adenopathy.  No palpable hepatosplenomegaly.  No rashes.  No Waldeyer's ring enlargement.  No respiratory distress or wheezes.    /85   Pulse 79   Temp 97.2 °F (36.2 °C)   Resp 18   Ht 175.3 cm (69\")   Wt 74.8 kg (165 lb)   SpO2 96%   BMI 24.37 kg/m²     ECOG score: 0           ECOG: (0) Fully Active - Able to Carry On All Pre-disease " Performance Without Restriction    Lab Results   Component Value Date    HGB 11.8 06/27/2024    HCT 35.3 06/27/2024    MCV 86 06/27/2024     06/27/2024    WBC 3.1 (L) 06/27/2024    NEUTROABS 1.5 06/27/2024    LYMPHSABS 1.1 06/27/2024    MONOSABS 0.3 06/27/2024    EOSABS 0.1 06/27/2024    BASOSABS 0.0 06/27/2024     Lab Results   Component Value Date    GLUCOSE 180 (H) 06/06/2024    BUN 22 06/06/2024    CREATININE 0.80 06/06/2024     06/06/2024    K 3.6 06/06/2024    CL 99 06/06/2024    CO2 25 06/06/2024    CALCIUM 9.7 06/06/2024    PROTEINTOT 7.5 10/05/2023    ALBUMIN 4.6 06/06/2024    BILITOT 0.7 06/06/2024    ALKPHOS 88 06/06/2024    AST 27 06/06/2024    ALT 22 06/06/2024         Assessment & Plan   1.  Leukopenia  2.  Diabetes  3.  Hypertension  4.  Left shoulder repair 3/6/2018      Hematology history timeline:  -2/28/2018 white count 4360 lower limit of normal 4500 otherwise unremarkable differential and CBC  -12/21/2021 CBC normal including white count 4020.  -10/5/2023 CBC normal including white count 3870.  -6/6/2024 white count 3000 with otherwise unremarkable CBC and differential  -6/27/2024 white count 3100 with otherwise unremarkable CBC and differential.    -8/20/2024 [facility] hematology consult: Until about 3 years ago the patient was on ACE inhibitor for blood pressure but being -American and that not being the best drug for that situation, she was switched to amlodipine and hydrochlorothiazide.  Thiazide diuretics are on the list of culprits for neutropenia.  Amlodipine has this listed but probably 1% or less.  The magnitude of her leukopenia is not so low as to recommend cessation of either of these medications as blood pressure control is important and clinically the levels of leukopenia should be clinically irrelevant.  I will check copper, B12, folate, HIV, EBV, hepatitis ABC for completeness sake.  We will check peripheral smear.  If this is persistent I will check peripheral  blood flow cytometry and T-cell gene rearrangement along with MISTY and CCP but she has no stigmata for autoimmune disease.  May Null neutropenia formerly known as benign ethnic neutropenia is very common in her demographic but that should have been present dating back to 2018 and it only presented in the last 3 years.    Total time of care today inclusive of time spent today prior to patient's arrival reviewing past records and interviewing her as to signs or symptoms of her disease and management thereof and after visit instituting this plan took 1 hour patient care time throughout the day today.      Javad Escobar MD    8/20/2024

## 2024-08-23 ENCOUNTER — LAB (OUTPATIENT)
Dept: LAB | Facility: HOSPITAL | Age: 54
End: 2024-08-23
Payer: MEDICAID

## 2024-08-23 DIAGNOSIS — D72.818 OTHER DECREASED WHITE BLOOD CELL (WBC) COUNT: ICD-10-CM

## 2024-08-23 LAB
BASOPHILS # BLD AUTO: 0.03 10*3/MM3 (ref 0–0.2)
BASOPHILS NFR BLD AUTO: 1 % (ref 0–1.5)
DEPRECATED RDW RBC AUTO: 41.6 FL (ref 37–54)
EOSINOPHIL # BLD AUTO: 0.08 10*3/MM3 (ref 0–0.4)
EOSINOPHIL NFR BLD AUTO: 2.7 % (ref 0.3–6.2)
ERYTHROCYTE [DISTWIDTH] IN BLOOD BY AUTOMATED COUNT: 13.2 % (ref 12.3–15.4)
FOLATE SERPL-MCNC: 19 NG/ML (ref 4.78–24.2)
HAV IGM SERPL QL IA: NORMAL
HBV CORE IGM SERPL QL IA: NORMAL
HBV SURFACE AG SERPL QL IA: NORMAL
HCT VFR BLD AUTO: 36.7 % (ref 34–46.6)
HCV AB SER QL: NORMAL
HGB BLD-MCNC: 12.6 G/DL (ref 12–15.9)
HIV 1+2 AB+HIV1 P24 AG SERPL QL IA: NORMAL
IMM GRANULOCYTES # BLD AUTO: 0 10*3/MM3 (ref 0–0.05)
IMM GRANULOCYTES NFR BLD AUTO: 0 % (ref 0–0.5)
LYMPHOCYTES # BLD AUTO: 1.16 10*3/MM3 (ref 0.7–3.1)
LYMPHOCYTES NFR BLD AUTO: 38.5 % (ref 19.6–45.3)
MCH RBC QN AUTO: 29 PG (ref 26.6–33)
MCHC RBC AUTO-ENTMCNC: 34.3 G/DL (ref 31.5–35.7)
MCV RBC AUTO: 84.4 FL (ref 79–97)
MONOCYTES # BLD AUTO: 0.31 10*3/MM3 (ref 0.1–0.9)
MONOCYTES NFR BLD AUTO: 10.3 % (ref 5–12)
NEUTROPHILS NFR BLD AUTO: 1.43 10*3/MM3 (ref 1.7–7)
NEUTROPHILS NFR BLD AUTO: 47.5 % (ref 42.7–76)
PLATELET # BLD AUTO: 245 10*3/MM3 (ref 140–450)
PMV BLD AUTO: 9.5 FL (ref 6–12)
RBC # BLD AUTO: 4.35 10*6/MM3 (ref 3.77–5.28)
VIT B12 BLD-MCNC: >2000 PG/ML (ref 211–946)
WBC NRBC COR # BLD AUTO: 3.01 10*3/MM3 (ref 3.4–10.8)

## 2024-08-23 PROCEDURE — 86038 ANTINUCLEAR ANTIBODIES: CPT

## 2024-08-23 PROCEDURE — 86706 HEP B SURFACE ANTIBODY: CPT

## 2024-08-23 PROCEDURE — 82746 ASSAY OF FOLIC ACID SERUM: CPT

## 2024-08-23 PROCEDURE — 82607 VITAMIN B-12: CPT

## 2024-08-23 PROCEDURE — G0432 EIA HIV-1/HIV-2 SCREEN: HCPCS

## 2024-08-23 PROCEDURE — 87798 DETECT AGENT NOS DNA AMP: CPT

## 2024-08-23 PROCEDURE — 82525 ASSAY OF COPPER: CPT

## 2024-08-23 PROCEDURE — 86704 HEP B CORE ANTIBODY TOTAL: CPT

## 2024-08-23 PROCEDURE — 80074 ACUTE HEPATITIS PANEL: CPT

## 2024-08-23 PROCEDURE — 86200 CCP ANTIBODY: CPT

## 2024-08-23 PROCEDURE — 85060 BLOOD SMEAR INTERPRETATION: CPT

## 2024-08-23 PROCEDURE — 36415 COLL VENOUS BLD VENIPUNCTURE: CPT

## 2024-08-23 PROCEDURE — 85025 COMPLETE CBC W/AUTO DIFF WBC: CPT

## 2024-08-24 LAB
CCP IGA+IGG SERPL IA-ACNC: 18 UNITS (ref 0–19)
HBV CORE AB SERPL QL IA: NEGATIVE
HBV SURFACE AB SER QL: NON REACTIVE
HBV SURFACE AG SERPL QL IA: NEGATIVE
IMP & REVIEW OF LAB RESULTS: NORMAL
LABORATORY COMMENT REPORT: NORMAL

## 2024-08-26 LAB
CYTOLOGIST CVX/VAG CYTO: NORMAL
PATH INTERP BLD-IMP: NORMAL

## 2024-08-27 DIAGNOSIS — E11.65 TYPE 2 DIABETES MELLITUS WITH HYPERGLYCEMIA, WITHOUT LONG-TERM CURRENT USE OF INSULIN: Primary | ICD-10-CM

## 2024-08-27 DIAGNOSIS — Z79.899 ENCOUNTER FOR LONG-TERM (CURRENT) USE OF OTHER MEDICATIONS: ICD-10-CM

## 2024-08-27 LAB
ANA HOMOGEN TITR SER: ABNORMAL {TITER}
ANA SER QL IF: POSITIVE
Lab: ABNORMAL

## 2024-08-28 ENCOUNTER — LAB (OUTPATIENT)
Dept: FAMILY MEDICINE CLINIC | Facility: CLINIC | Age: 54
End: 2024-08-28
Payer: MEDICAID

## 2024-08-28 LAB — COPPER SERPL-MCNC: 104 UG/DL (ref 80–158)

## 2024-08-29 LAB
BUN SERPL-MCNC: 20 MG/DL (ref 6–24)
BUN/CREAT SERPL: 28 (ref 9–23)
CALCIUM SERPL-MCNC: 9.6 MG/DL (ref 8.7–10.2)
CHLORIDE SERPL-SCNC: 95 MMOL/L (ref 96–106)
CO2 SERPL-SCNC: 24 MMOL/L (ref 20–29)
CREAT SERPL-MCNC: 0.72 MG/DL (ref 0.57–1)
EGFRCR SERPLBLD CKD-EPI 2021: 99 ML/MIN/1.73
GLUCOSE SERPL-MCNC: 148 MG/DL (ref 70–99)
HBA1C MFR BLD: 7.6 % (ref 4.8–5.6)
POTASSIUM SERPL-SCNC: 3.7 MMOL/L (ref 3.5–5.2)
SODIUM SERPL-SCNC: 135 MMOL/L (ref 134–144)

## 2024-08-30 LAB — REF LAB TEST METHOD: NORMAL

## 2024-09-17 ENCOUNTER — OFFICE VISIT (OUTPATIENT)
Dept: ONCOLOGY | Facility: CLINIC | Age: 54
End: 2024-09-17
Payer: MEDICAID

## 2024-09-17 VITALS
SYSTOLIC BLOOD PRESSURE: 144 MMHG | DIASTOLIC BLOOD PRESSURE: 89 MMHG | TEMPERATURE: 97.5 F | WEIGHT: 160 LBS | HEIGHT: 69 IN | OXYGEN SATURATION: 99 % | RESPIRATION RATE: 16 BRPM | HEART RATE: 68 BPM | BODY MASS INDEX: 23.7 KG/M2

## 2024-09-17 DIAGNOSIS — R76.8 POSITIVE ANA (ANTINUCLEAR ANTIBODY): ICD-10-CM

## 2024-09-17 DIAGNOSIS — D70.8 OTHER NEUTROPENIA: Primary | ICD-10-CM

## 2024-09-17 PROCEDURE — 3079F DIAST BP 80-89 MM HG: CPT | Performed by: INTERNAL MEDICINE

## 2024-09-17 PROCEDURE — 1160F RVW MEDS BY RX/DR IN RCRD: CPT | Performed by: INTERNAL MEDICINE

## 2024-09-17 PROCEDURE — 3077F SYST BP >= 140 MM HG: CPT | Performed by: INTERNAL MEDICINE

## 2024-09-17 PROCEDURE — 1159F MED LIST DOCD IN RCRD: CPT | Performed by: INTERNAL MEDICINE

## 2024-09-17 PROCEDURE — 1126F AMNT PAIN NOTED NONE PRSNT: CPT | Performed by: INTERNAL MEDICINE

## 2024-09-17 PROCEDURE — 99214 OFFICE O/P EST MOD 30 MIN: CPT | Performed by: INTERNAL MEDICINE

## 2024-11-04 ENCOUNTER — OFFICE VISIT (OUTPATIENT)
Dept: FAMILY MEDICINE CLINIC | Facility: CLINIC | Age: 54
End: 2024-11-04
Payer: MEDICAID

## 2024-11-04 VITALS
HEART RATE: 63 BPM | HEIGHT: 69 IN | DIASTOLIC BLOOD PRESSURE: 86 MMHG | OXYGEN SATURATION: 99 % | BODY MASS INDEX: 23.56 KG/M2 | SYSTOLIC BLOOD PRESSURE: 138 MMHG | WEIGHT: 159.06 LBS

## 2024-11-04 DIAGNOSIS — E11.9 TYPE 2 DIABETES MELLITUS WITHOUT COMPLICATION, WITHOUT LONG-TERM CURRENT USE OF INSULIN: ICD-10-CM

## 2024-11-04 DIAGNOSIS — Z23 NEEDS FLU SHOT: ICD-10-CM

## 2024-11-04 DIAGNOSIS — I10 BENIGN ESSENTIAL HTN: Primary | ICD-10-CM

## 2024-11-04 DIAGNOSIS — J06.9 ACUTE URI: ICD-10-CM

## 2024-11-04 DIAGNOSIS — M79.642 PAIN IN BOTH HANDS: ICD-10-CM

## 2024-11-04 DIAGNOSIS — Z12.31 ENCOUNTER FOR SCREENING MAMMOGRAM FOR MALIGNANT NEOPLASM OF BREAST: ICD-10-CM

## 2024-11-04 DIAGNOSIS — M79.641 PAIN IN BOTH HANDS: ICD-10-CM

## 2024-11-04 DIAGNOSIS — Z12.4 SCREENING FOR CERVICAL CANCER: ICD-10-CM

## 2024-11-04 DIAGNOSIS — Z12.11 SCREENING FOR COLON CANCER: ICD-10-CM

## 2024-11-04 LAB
EXPIRATION DATE: NORMAL
EXPIRATION DATE: NORMAL
INTERNAL CONTROL: NORMAL
Lab: NORMAL
Lab: NORMAL
POC CREATININE URINE: 10
POC MICROALBUMIN URINE: 10
S PYO AG THROAT QL: NEGATIVE

## 2024-11-04 PROCEDURE — 90656 IIV3 VACC NO PRSV 0.5 ML IM: CPT | Performed by: NURSE PRACTITIONER

## 2024-11-04 PROCEDURE — 1160F RVW MEDS BY RX/DR IN RCRD: CPT | Performed by: NURSE PRACTITIONER

## 2024-11-04 PROCEDURE — 82044 UR ALBUMIN SEMIQUANTITATIVE: CPT | Performed by: NURSE PRACTITIONER

## 2024-11-04 PROCEDURE — 1159F MED LIST DOCD IN RCRD: CPT | Performed by: NURSE PRACTITIONER

## 2024-11-04 PROCEDURE — 3075F SYST BP GE 130 - 139MM HG: CPT | Performed by: NURSE PRACTITIONER

## 2024-11-04 PROCEDURE — 3051F HG A1C>EQUAL 7.0%<8.0%: CPT | Performed by: NURSE PRACTITIONER

## 2024-11-04 PROCEDURE — 3079F DIAST BP 80-89 MM HG: CPT | Performed by: NURSE PRACTITIONER

## 2024-11-04 PROCEDURE — 1126F AMNT PAIN NOTED NONE PRSNT: CPT | Performed by: NURSE PRACTITIONER

## 2024-11-04 PROCEDURE — 90471 IMMUNIZATION ADMIN: CPT | Performed by: NURSE PRACTITIONER

## 2024-11-04 PROCEDURE — 99214 OFFICE O/P EST MOD 30 MIN: CPT | Performed by: NURSE PRACTITIONER

## 2024-11-04 PROCEDURE — 87880 STREP A ASSAY W/OPTIC: CPT | Performed by: NURSE PRACTITIONER

## 2024-11-04 RX ORDER — MELOXICAM 7.5 MG/1
7.5 TABLET ORAL DAILY
Qty: 30 TABLET | Refills: 2 | Status: SHIPPED | OUTPATIENT
Start: 2024-11-04

## 2024-11-04 RX ORDER — FLUTICASONE PROPIONATE 50 MCG
2 SPRAY, SUSPENSION (ML) NASAL DAILY
Qty: 16 G | Refills: 2 | Status: SHIPPED | OUTPATIENT
Start: 2024-11-04

## 2024-11-04 NOTE — PROGRESS NOTES
"Chief Complaint  follow up labs    Subjective          Sherrie Harris presents to [facility]  History of Present Illness  History of Present Illness  The patient is a 53-year-old female here for follow-up of hypertension and diabetes.    She reports feeling well overall. She has lost approximately 16 pounds and maintains an active lifestyle, including walking 3 miles daily.    She has been experiencing a scratchy throat for the past few days due to exposure to air conditioning at her workplace. She reports no fever or chills but does have some drainage and a mild cough. She has been using Halls lozenges for relief and has not been in contact with anyone who is ill.    She has been referred to Rheumatology by Dr. Escobar due to concerns about a potential autoimmune deficiency, possibly lupus. She experiences constant hand pain, which worsens in cold weather, and has difficulty bending her fingers, causing her pain. 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 She has an appointment with a rheumatologist in 2025. She has previously taken meloxicam for foot pain.    She has not had a mammogram this year and has never undergone a colonoscopy.    FAMILY HISTORY  Her father was diagnosed with lupus at the age of 70 and  at the age of 77 of a massive stroke.    Objective   Vital Signs:   /86   Pulse 63   Ht 175.3 cm (69\")   Wt 72.2 kg (159 lb 1 oz)   SpO2 99%   BMI 23.49 kg/m²     Body mass index is 23.49 kg/m².    Review of Systems   Constitutional:  Negative for chills, fatigue and fever.   HENT:  Positive for congestion, postnasal drip, rhinorrhea and sore throat. Negative for ear pain, sinus pressure, sneezing, swollen glands and trouble swallowing.    Eyes:  Negative for visual disturbance.   Respiratory:  Negative for cough, shortness of breath and wheezing.    Cardiovascular: Negative.    Gastrointestinal:  Negative for abdominal pain, " constipation, diarrhea, nausea and vomiting.   Endocrine: Negative for polydipsia, polyphagia and polyuria.   Genitourinary:  Negative for decreased urine volume, dysuria, frequency, hematuria and urgency.   Musculoskeletal:  Positive for arthralgias. Negative for joint swelling.   Skin:  Negative for color change, rash, skin lesions and wound.   Neurological:  Negative for weakness, numbness and headache.       Past History:  Medical History: has a past medical history of Hypertension, Left shoulder pain, and Palpitations.   Surgical History: has a past surgical history that includes Lipoma Excision (2016); Uterine fibroid surgery; ORIF finger / thumb fracture (2004); and Shoulder arthroscopy (Left, 3/6/2018).   Family History: family history includes Diabetes in her mother; Hypertension in her father and mother.   Social History: reports that she has never smoked. She has never used smokeless tobacco. She reports current alcohol use. She reports that she does not use drugs.    PHQ-2 Depression Screening  Little interest or pleasure in doing things?     Feeling down, depressed, or hopeless?     PHQ-2 Total Score         PHQ-9 Depression Screening  Little interest or pleasure in doing things?     Feeling down, depressed, or hopeless?     PHQ-2 Total Score     Trouble falling or staying asleep, or sleeping too much?     Feeling tired or having little energy?     Poor appetite or overeating?     Feeling bad about yourself - or that you are a failure or have let yourself or your family down?     Trouble concentrating on things, such as reading the newspaper or watching television?     Moving or speaking so slowly that other people could have noticed? Or the opposite - being so fidgety or restless that you have been moving around a lot more than usual?     Thoughts that you would be better off dead, or of hurting yourself in some way?     PHQ-9 Total Score     If you checked off any problems, how difficult have these  problems made it for you to do your work, take care of things at home, or get along with other people?          PHQ-9 Total Score:        Patient screened positive for depression based on a PHQ-9 score of  on . Follow-up recommendations include:          Current Outpatient Medications:     amLODIPine (NORVASC) 5 MG tablet, Take 1 tablet by mouth Daily., Disp: 90 tablet, Rfl: 3    hydroCHLOROthiazide 25 MG tablet, Take 1 tablet by mouth Daily., Disp: 90 tablet, Rfl: 3    VITAMIN D PO, Take 3,000 Units by mouth., Disp: , Rfl:     fluticasone (FLONASE) 50 MCG/ACT nasal spray, Administer 2 sprays into the nostril(s) as directed by provider Daily., Disp: 16 g, Rfl: 2    meloxicam (Mobic) 7.5 MG tablet, Take 1 tablet by mouth Daily., Disp: 30 tablet, Rfl: 2   (Not in a hospital admission)     Allergies: Amoxicillin-pot clavulanate    Physical Exam  Constitutional:       Appearance: Normal appearance.   HENT:      Right Ear: Tympanic membrane, ear canal and external ear normal.      Left Ear: Tympanic membrane, ear canal and external ear normal.      Nose: Nose normal.      Mouth/Throat:      Mouth: Mucous membranes are moist.      Pharynx: Oropharynx is clear.   Eyes:      Conjunctiva/sclera: Conjunctivae normal.      Pupils: Pupils are equal, round, and reactive to light.   Cardiovascular:      Rate and Rhythm: Normal rate and regular rhythm.      Heart sounds: Normal heart sounds.   Pulmonary:      Effort: Pulmonary effort is normal.      Breath sounds: Normal breath sounds.   Musculoskeletal:      Right hand: Normal.      Left hand: Normal.   Skin:     General: Skin is warm.      Findings: No erythema or rash.   Neurological:      General: No focal deficit present.      Mental Status: She is alert and oriented to person, place, and time. Mental status is at baseline.   Psychiatric:         Mood and Affect: Mood normal.         Behavior: Behavior normal.         Thought Content: Thought content normal.         Judgment:  Judgment normal.        Physical Exam      Result Review :          Results               Assessment and Plan    Diagnoses and all orders for this visit:    1. Benign essential HTN (Primary)  -     CBC & Differential  -     Comprehensive Metabolic Panel    2. Screening for colon cancer  -     Cologuard - Stool, Per Rectum; Future    3. Screening for cervical cancer    4. Encounter for screening mammogram for malignant neoplasm of breast  -     Mammo Screening Digital Tomosynthesis Bilateral With CAD; Future    5. Type 2 diabetes mellitus without complication, without long-term current use of insulin  -     Hemoglobin A1c  -     POC Microalbumin; Future  -     POC Microalbumin    6. Acute URI  -     fluticasone (FLONASE) 50 MCG/ACT nasal spray; Administer 2 sprays into the nostril(s) as directed by provider Daily.  Dispense: 16 g; Refill: 2  -     POC Rapid Strep A; Future  -     POC Rapid Strep A  -     Throat / Upper Respiratory Culture - Swab, Throat    7. Pain in both hands  -     meloxicam (Mobic) 7.5 MG tablet; Take 1 tablet by mouth Daily.  Dispense: 30 tablet; Refill: 2    8. Needs flu shot  -     Fluzone >6mos ([id number]); Future  -     Fluzone >6mos ([id number])      Assessment & Plan  1. Hypertension.  Blood work will be conducted to assess liver function, kidney function, and blood counts.  Continue current medications.  Goal blood pressure less than 140/90.  Let me know if gets to or above that.  Proper diet and exercise plan discussed and encouraged.  Education provided.  Return to clinic or ED with any issues or concerns.    2. Diabetes Mellitus.  Blood work will be conducted to check A1c levels. She has lost 8-16 pounds and continues to exercise regularly. If blood sugar levels are not adequately controlled, metformin will be considered, specifically the rapid release form if needed.  Check feet daily.  Annual eye exams encouraged.    3. Upper Respiratory Infection.  Symptoms include a scratchy throat,  gunkiness, and drainage. A nasal steroid will be prescribed. Over-the-counter remedies such as sugar-free Halls are recommended.  Can use Coricidin as needed for cough and congestion.  Stay hydrated with water. if symptoms do not improve by the middle to end of the week, she should call for a possible antibiotic prescription.  Viral versus bacterial discussed likely viral.    4. Hand pain.  A prescription for meloxicam will be provided to manage her hand pain, especially during colder weather or when exposed to air conditioning at work.  Risk of meds discussed and understood.  Education provided.  She will keep follow-up appointment with rheumatology as scheduled.    5. Suspected Autoimmune Disorder.  She has been referred to rheumatology for further evaluation, with an appointment scheduled for January. Symptoms include joint aches and pain, particularly in the hands.    6. Health Maintenance.  Flu shot given today in clinic.  Vaccine information sheet given.  Risk discussed and understood.  Patient tolerated well.  A mammogram will be ordered. A Cologuard test will be sent to her home for completion.  Informed her to call afterwards for results.  If she does not receive Cologuard in the mail within 2 weeks she is to contact us and let us know.  She denies all immunizations including flu pneumonia shingles COVID tetanus.  She will discuss Pap smears with gynecology.                BMI is within normal parameters. No other follow-up for BMI required.       Follow Up   Return in about 6 months (around 5/4/2025), or if symptoms worsen or fail to improve.  Patient was given instructions and counseling regarding her condition or for health maintenance advice. Please see specific information pulled into the AVS if appropriate.     Patient or patient representative verbalized consent for the use of Ambient Listening during the visit with  MARCIE Stacy for chart documentation. 11/4/2024  16:29 EST    Johnny  Daniel, APRN

## 2024-11-05 LAB
ALBUMIN SERPL-MCNC: 4.4 G/DL (ref 3.8–4.9)
ALP SERPL-CCNC: 91 IU/L (ref 44–121)
ALT SERPL-CCNC: 16 IU/L (ref 0–32)
AST SERPL-CCNC: 29 IU/L (ref 0–40)
BASOPHILS # BLD AUTO: 0 X10E3/UL (ref 0–0.2)
BASOPHILS NFR BLD AUTO: 1 %
BILIRUB SERPL-MCNC: 0.4 MG/DL (ref 0–1.2)
BUN SERPL-MCNC: 16 MG/DL (ref 6–24)
BUN/CREAT SERPL: 25 (ref 9–23)
CALCIUM SERPL-MCNC: 9.9 MG/DL (ref 8.7–10.2)
CHLORIDE SERPL-SCNC: 96 MMOL/L (ref 96–106)
CO2 SERPL-SCNC: 27 MMOL/L (ref 20–29)
CREAT SERPL-MCNC: 0.65 MG/DL (ref 0.57–1)
EGFRCR SERPLBLD CKD-EPI 2021: 104 ML/MIN/1.73
EOSINOPHIL # BLD AUTO: 0.1 X10E3/UL (ref 0–0.4)
EOSINOPHIL NFR BLD AUTO: 3 %
ERYTHROCYTE [DISTWIDTH] IN BLOOD BY AUTOMATED COUNT: 13.1 % (ref 11.7–15.4)
GLOBULIN SER CALC-MCNC: 2.8 G/DL (ref 1.5–4.5)
GLUCOSE SERPL-MCNC: 159 MG/DL (ref 70–99)
HBA1C MFR BLD: 7.6 % (ref 4.8–5.6)
HCT VFR BLD AUTO: 39.3 % (ref 34–46.6)
HGB BLD-MCNC: 12.2 G/DL (ref 11.1–15.9)
IMM GRANULOCYTES # BLD AUTO: 0 X10E3/UL (ref 0–0.1)
IMM GRANULOCYTES NFR BLD AUTO: 0 %
LYMPHOCYTES # BLD AUTO: 1.7 X10E3/UL (ref 0.7–3.1)
LYMPHOCYTES NFR BLD AUTO: 41 %
MCH RBC QN AUTO: 27.8 PG (ref 26.6–33)
MCHC RBC AUTO-ENTMCNC: 31 G/DL (ref 31.5–35.7)
MCV RBC AUTO: 90 FL (ref 79–97)
MONOCYTES # BLD AUTO: 0.4 X10E3/UL (ref 0.1–0.9)
MONOCYTES NFR BLD AUTO: 9 %
NEUTROPHILS # BLD AUTO: 1.8 X10E3/UL (ref 1.4–7)
NEUTROPHILS NFR BLD AUTO: 46 %
PLATELET # BLD AUTO: 248 X10E3/UL (ref 150–450)
POTASSIUM SERPL-SCNC: 4.3 MMOL/L (ref 3.5–5.2)
PROT SERPL-MCNC: 7.2 G/DL (ref 6–8.5)
RBC # BLD AUTO: 4.39 X10E6/UL (ref 3.77–5.28)
SODIUM SERPL-SCNC: 135 MMOL/L (ref 134–144)
WBC # BLD AUTO: 4.1 X10E3/UL (ref 3.4–10.8)

## 2024-11-06 LAB
BACTERIA SPEC RESP CULT: NORMAL
BACTERIA SPEC RESP CULT: NORMAL

## 2024-11-12 ENCOUNTER — TELEPHONE (OUTPATIENT)
Dept: FAMILY MEDICINE CLINIC | Facility: CLINIC | Age: 54
End: 2024-11-12
Payer: MEDICAID

## 2024-11-12 NOTE — TELEPHONE ENCOUNTER
HUB TO RELAY: [facility] for pt to call back.   ----- Message from Johnny Sanchez sent at 11/11/2024  9:21 AM EST -----  Please let patient know her diabetes has remained stable with an A1c of 7.6.  This is what it was 2 months ago.  At this point I would recommend that we start her on a diabetes medication such as metformin.  Would she be interested in this?  Ideally I would like to get her A1c 7 or less.  Continue with her healthy diet and exercise.  Let me know about the medication if she would like to try it.  Remaining labs stable.  Throat culture negative.  Thanks   pt c/o swelling and pain to left knee noticed last thurs, pain went away and came yesterday morning denies any injury Hpi Title: Evaluation of Skin Lesions

## 2024-11-12 NOTE — TELEPHONE ENCOUNTER
Name: Sherrie Harris    Relationship: Self    Best Callback Number: [phone]    HUB PROVIDED THE RELAY MESSAGE FROM THE OFFICE   PATIENT VOICED UNDERSTANDING AND HAS NO FURTHER QUESTIONS AT THIS TIME    ADDITIONAL INFORMATION:   YES PT STATED THAT SHE INTETESTED IN METFORMIN, PT WILL LIKE THE RAPID RELIEF

## 2024-11-13 NOTE — TELEPHONE ENCOUNTER
Name: Kimberly Sherrie M      Relationship: Self      Best Callback Number: [phone]       HUB PROVIDED THE RELAY MESSAGE FROM THE OFFICE      PATIENT: VOICED UNDERSTANDING AND HAS NO FURTHER QUESTIONS AT THIS TIME    ADDITIONAL INFORMATION:  3MTH SCHEDULED

## 2024-11-21 ENCOUNTER — TRANSCRIBE ORDERS (OUTPATIENT)
Dept: GENERAL RADIOLOGY | Facility: CLINIC | Age: 54
End: 2024-11-21
Payer: MEDICAID

## 2024-11-21 DIAGNOSIS — M79.644 PAIN IN FINGER OF RIGHT HAND: Primary | ICD-10-CM

## 2024-12-16 ENCOUNTER — OFFICE VISIT (OUTPATIENT)
Age: 54
End: 2024-12-16
Payer: MEDICAID

## 2024-12-16 VITALS
HEIGHT: 68 IN | DIASTOLIC BLOOD PRESSURE: 82 MMHG | SYSTOLIC BLOOD PRESSURE: 130 MMHG | WEIGHT: 152.1 LBS | BODY MASS INDEX: 23.05 KG/M2

## 2024-12-16 DIAGNOSIS — S63.639A SPRAIN OF PROXIMAL INTERPHALANGEAL (PIP) JOINT OF FINGER: Primary | ICD-10-CM

## 2024-12-16 PROCEDURE — 3079F DIAST BP 80-89 MM HG: CPT | Performed by: PLASTIC SURGERY

## 2024-12-16 PROCEDURE — 1160F RVW MEDS BY RX/DR IN RCRD: CPT | Performed by: PLASTIC SURGERY

## 2024-12-16 PROCEDURE — 1159F MED LIST DOCD IN RCRD: CPT | Performed by: PLASTIC SURGERY

## 2024-12-16 PROCEDURE — 99203 OFFICE O/P NEW LOW 30 MIN: CPT | Performed by: PLASTIC SURGERY

## 2024-12-16 PROCEDURE — 3075F SYST BP GE 130 - 139MM HG: CPT | Performed by: PLASTIC SURGERY

## 2024-12-16 NOTE — PROGRESS NOTES
[facility]     Office Visit       Date: 12/16/2024   Patient Name: Sherrie Harris  MRN: [id number]  YOB: 1970    Referring Physician: Referring, Self     Chief Complaint:   Chief Complaint   Patient presents with    Right Hand - Pain       History of Present Illness:   Sherrie Harris is a 54 y.o. female presents for evaluation of right small finger pain at the PIP.  She reports she hyperextended her right small finger November 14.  She reports immediate pain and swelling.  Reports this is slowly improved however she continues to have some pain and swelling particularly with use.  She has not tried therapy.  She has no other relevant medical history.  She denies smoking.      Subjective   Review of Systems:   Review of Systems   Constitutional:  Negative for chills, fever, unexpected weight gain and unexpected weight loss.   HENT:  Negative for congestion, postnasal drip and rhinorrhea.    Eyes:  Negative for blurred vision.   Respiratory:  Negative for shortness of breath.    Cardiovascular:  Negative for leg swelling.   Gastrointestinal:  Negative for abdominal pain, nausea and vomiting.   Genitourinary:  Negative for difficulty urinating.   Musculoskeletal:  Positive for arthralgias. Negative for gait problem, joint swelling and myalgias.   Skin:  Negative for skin lesions and wound.   Neurological:  Negative for dizziness, weakness, light-headedness and numbness.   Hematological:  Does not bruise/bleed easily.   Psychiatric/Behavioral:  Negative for depressed mood.         Pertinent review of systems per HPI.     I reviewed the patient's chief complaint, history of present illness, review of systems, past medical history, surgical history, family history, social history, medications and allergy list in the EMR on 12/16/2024 and agree with the findings above.    Objective    Vital Signs:   Vitals:    12/16/24 1451  "  BP: 130/82   Weight: 69 kg (152 lb 1.6 oz)   Height: 172.7 cm (68\")     BMI: Body mass index is 23.13 kg/m².    General Appearance: No acute distress. Alert and oriented.     Chest:  Non-labored breathing on room air. Regular rate and rhythm.    Upper Extremity Exam:    Right small finger PIP with approximately 15 degree flexion contracture that is passively correctable.  Patient has approximately 90 degrees passive flexion of the PIP.  Approximately 60 degrees of flexion at the PIP  FDS FDP are intact.  Negative Josiah test.    Fingers are warm, well-perfused with appropriate capillary refill.  Palpable radial pulse.    Sensation intact to light touch in median, radial and ulnar nerve distributions.    Motor- Fires FPL, ulnar intrinsics, EPL/EDC w/ full active and passive range of motion. Strength intact.    Non-tender except for in the areas highlighted    Imaging/Studies:   Imaging Results (Last 24 Hours)       ** No results found for the last 24 hours. **            X-ray of the right small finger from 11/22/2014 bone reviewed and interpreted myself demonstrate no evidence of bony abnormality    Procedures:  Procedures    Quality Measures:   ACP:   ACP discussion was deferred.    Tobacco:   Sherrie Harris  reports that she has never smoked. She has never used smokeless tobacco.      Assessment / Plan    Assessment/Plan:     There are no diagnoses linked to this encounter.     Sherrie Harrisis a 54 y.o. female who presents with:      ICD-10-CM ICD-9-CM   1. Sprain of proximal interphalangeal (PIP) joint of finger  S63.639A 842.13         Patient presents with a right small finger PIP sprain that occurred months ago.  We discussed the diagnosis of PIP sprains as well as the options for treatment.  She is starting to have a small flexion contracture of the PIP.  Recommend referral to physical therapy for active and passive range of motion of the right small finger.  Recommend patient follow-up in 8 weeks for " repeat check.    Follow Up:   Return in about 2 months (around 2/16/2025) for F/U with Hafsa.        Óscar Galladro MD  [facility]

## 2025-01-15 ENCOUNTER — TELEPHONE (OUTPATIENT)
Dept: ORTHOPEDIC SURGERY | Facility: CLINIC | Age: 55
End: 2025-01-15
Payer: MEDICAID

## 2025-01-15 NOTE — TELEPHONE ENCOUNTER
I received a call from Cee Harry with [employer]. She was looking for records and physical therapy order for this patient. I explained to Cee that I could not find any indication of worker's comp information in the chart.     Cee advised me that the patient has designated Dr. Gallardo as her physician for this injury. I have uploaded that signed form into [employer].     Employer: [employer]  Work Comp: [employer]  Claim# [id number]  DOI: 11/14/2024    Claims Rep: Cee Harry  Email: [email or web address]  Phone: [phone]  Fax: [phone]

## 2025-01-15 NOTE — TELEPHONE ENCOUNTER
HUB PLEASE TRANSFER THE PATIENT TO OFFICE WHEN SHE CALLS.    [facility] FOR PATIENT TO OBTAIN HER PERMISSION TO SEND THE 12/16/24 OFFICE NOTE AND PHYSICAL THERAPY ORDER TO ERICKA HARPER. THEY NEED THIS INFORMATION FROM US IN ORDER TO APPROVE TO COVER PHYSICAL THERAPY VISITS.

## 2025-01-15 NOTE — TELEPHONE ENCOUNTER
Ms. Kimberly called me back. She verbally authorized for me to release the office note and PT order to [employer].    I have sent that to Cee Harry at her contact listed previously.

## 2025-01-28 ENCOUNTER — LAB (OUTPATIENT)
Facility: HOSPITAL | Age: 55
End: 2025-01-28
Payer: OTHER MISCELLANEOUS

## 2025-01-28 ENCOUNTER — OFFICE VISIT (OUTPATIENT)
Age: 55
End: 2025-01-28
Payer: MEDICAID

## 2025-01-28 VITALS
BODY MASS INDEX: 23.48 KG/M2 | TEMPERATURE: 97.3 F | SYSTOLIC BLOOD PRESSURE: 132 MMHG | HEART RATE: 75 BPM | WEIGHT: 154.9 LBS | DIASTOLIC BLOOD PRESSURE: 88 MMHG | HEIGHT: 68 IN

## 2025-01-28 DIAGNOSIS — R76.8 ANA POSITIVE: ICD-10-CM

## 2025-01-28 DIAGNOSIS — M25.50 ARTHRALGIA OF MULTIPLE SITES: ICD-10-CM

## 2025-01-28 DIAGNOSIS — R76.8 ANA POSITIVE: Primary | ICD-10-CM

## 2025-01-28 DIAGNOSIS — M25.512 ACUTE PAIN OF LEFT SHOULDER: ICD-10-CM

## 2025-01-28 DIAGNOSIS — D70.9 NEUTROPENIA, UNSPECIFIED TYPE: ICD-10-CM

## 2025-01-28 DIAGNOSIS — M15.9 GENERALIZED OSTEOARTHROSIS, INVOLVING MULTIPLE SITES: ICD-10-CM

## 2025-01-28 LAB
ALBUMIN SERPL-MCNC: 4.8 G/DL (ref 3.5–5.2)
ALBUMIN/GLOB SERPL: 1.5 G/DL
ALP SERPL-CCNC: 81 U/L (ref 39–117)
ALT SERPL W P-5'-P-CCNC: 15 U/L (ref 1–33)
ANION GAP SERPL CALCULATED.3IONS-SCNC: 10.3 MMOL/L (ref 5–15)
AST SERPL-CCNC: 22 U/L (ref 1–32)
BASOPHILS # BLD AUTO: 0.03 10*3/MM3 (ref 0–0.2)
BASOPHILS NFR BLD AUTO: 0.8 % (ref 0–1.5)
BILIRUB SERPL-MCNC: 0.5 MG/DL (ref 0–1.2)
BILIRUB UR QL STRIP: NEGATIVE
BUN SERPL-MCNC: 17 MG/DL (ref 6–20)
BUN/CREAT SERPL: 24.6 (ref 7–25)
C3 SERPL-MCNC: 89 MG/DL (ref 82–167)
C4 SERPL-MCNC: 10 MG/DL (ref 14–44)
CALCIUM SPEC-SCNC: 10 MG/DL (ref 8.6–10.5)
CHLORIDE SERPL-SCNC: 98 MMOL/L (ref 98–107)
CHROMATIN AB SERPL-ACNC: 17.1 IU/ML (ref 0–14)
CLARITY UR: CLEAR
CO2 SERPL-SCNC: 28.7 MMOL/L (ref 22–29)
COLOR UR: YELLOW
CREAT SERPL-MCNC: 0.69 MG/DL (ref 0.57–1)
CRP SERPL-MCNC: <0.3 MG/DL (ref 0–0.5)
DEPRECATED RDW RBC AUTO: 42.1 FL (ref 37–54)
EGFRCR SERPLBLD CKD-EPI 2021: 102 ML/MIN/1.73
EOSINOPHIL # BLD AUTO: 0.08 10*3/MM3 (ref 0–0.4)
EOSINOPHIL NFR BLD AUTO: 2.2 % (ref 0.3–6.2)
ERYTHROCYTE [DISTWIDTH] IN BLOOD BY AUTOMATED COUNT: 13.4 % (ref 12.3–15.4)
ERYTHROCYTE [SEDIMENTATION RATE] IN BLOOD: 22 MM/HR (ref 0–30)
GLOBULIN UR ELPH-MCNC: 3.3 GM/DL
GLUCOSE SERPL-MCNC: 161 MG/DL (ref 65–99)
GLUCOSE UR STRIP-MCNC: NEGATIVE MG/DL
HCT VFR BLD AUTO: 39.4 % (ref 34–46.6)
HGB BLD-MCNC: 13.3 G/DL (ref 12–15.9)
HGB UR QL STRIP.AUTO: NEGATIVE
HOLD SPECIMEN: NORMAL
IMM GRANULOCYTES # BLD AUTO: 0 10*3/MM3 (ref 0–0.05)
IMM GRANULOCYTES NFR BLD AUTO: 0 % (ref 0–0.5)
KETONES UR QL STRIP: NEGATIVE
LEUKOCYTE ESTERASE UR QL STRIP.AUTO: ABNORMAL
LYMPHOCYTES # BLD AUTO: 0.97 10*3/MM3 (ref 0.7–3.1)
LYMPHOCYTES NFR BLD AUTO: 27.1 % (ref 19.6–45.3)
MCH RBC QN AUTO: 29.4 PG (ref 26.6–33)
MCHC RBC AUTO-ENTMCNC: 33.8 G/DL (ref 31.5–35.7)
MCV RBC AUTO: 87 FL (ref 79–97)
MONOCYTES # BLD AUTO: 0.27 10*3/MM3 (ref 0.1–0.9)
MONOCYTES NFR BLD AUTO: 7.5 % (ref 5–12)
NEUTROPHILS NFR BLD AUTO: 2.23 10*3/MM3 (ref 1.7–7)
NEUTROPHILS NFR BLD AUTO: 62.4 % (ref 42.7–76)
NITRITE UR QL STRIP: NEGATIVE
NRBC BLD AUTO-RTO: 0 /100 WBC (ref 0–0.2)
PH UR STRIP.AUTO: 7 [PH] (ref 5–8)
PLATELET # BLD AUTO: 286 10*3/MM3 (ref 140–450)
PMV BLD AUTO: 11.2 FL (ref 6–12)
POTASSIUM SERPL-SCNC: 3.4 MMOL/L (ref 3.5–5.2)
PROT SERPL-MCNC: 8.1 G/DL (ref 6–8.5)
PROT UR QL STRIP: NEGATIVE
RBC # BLD AUTO: 4.53 10*6/MM3 (ref 3.77–5.28)
SODIUM SERPL-SCNC: 137 MMOL/L (ref 136–145)
SP GR UR STRIP: 1.02 (ref 1–1.03)
UROBILINOGEN UR QL STRIP: ABNORMAL
WBC NRBC COR # BLD AUTO: 3.58 10*3/MM3 (ref 3.4–10.8)

## 2025-01-28 PROCEDURE — 86235 NUCLEAR ANTIGEN ANTIBODY: CPT

## 2025-01-28 PROCEDURE — 80053 COMPREHEN METABOLIC PANEL: CPT

## 2025-01-28 PROCEDURE — 85732 THROMBOPLASTIN TIME PARTIAL: CPT

## 2025-01-28 PROCEDURE — 86431 RHEUMATOID FACTOR QUANT: CPT

## 2025-01-28 PROCEDURE — 85598 HEXAGNAL PHOSPH PLTLT NEUTRL: CPT

## 2025-01-28 PROCEDURE — 86038 ANTINUCLEAR ANTIBODIES: CPT

## 2025-01-28 PROCEDURE — 85613 RUSSELL VIPER VENOM DILUTED: CPT

## 2025-01-28 PROCEDURE — 83516 IMMUNOASSAY NONANTIBODY: CPT

## 2025-01-28 PROCEDURE — 86037 ANCA TITER EACH ANTIBODY: CPT

## 2025-01-28 PROCEDURE — 86140 C-REACTIVE PROTEIN: CPT

## 2025-01-28 PROCEDURE — 86200 CCP ANTIBODY: CPT

## 2025-01-28 PROCEDURE — 85652 RBC SED RATE AUTOMATED: CPT

## 2025-01-28 PROCEDURE — 86160 COMPLEMENT ANTIGEN: CPT

## 2025-01-28 PROCEDURE — 85025 COMPLETE CBC W/AUTO DIFF WBC: CPT

## 2025-01-28 PROCEDURE — 86146 BETA-2 GLYCOPROTEIN ANTIBODY: CPT

## 2025-01-28 PROCEDURE — 86147 CARDIOLIPIN ANTIBODY EA IG: CPT

## 2025-01-28 PROCEDURE — 81001 URINALYSIS AUTO W/SCOPE: CPT

## 2025-01-28 PROCEDURE — 36415 COLL VENOUS BLD VENIPUNCTURE: CPT

## 2025-01-28 PROCEDURE — 86225 DNA ANTIBODY NATIVE: CPT

## 2025-01-28 NOTE — ASSESSMENT & PLAN NOTE
-Mild and asymptomatic  Orders:    ANCA Panel; Future    MISTY by IFA, Reflex 9-biomarkers profile; Future    CBC Auto Differential; Future    Comprehensive Metabolic Panel; Future    C-reactive Protein; Future    Sedimentation Rate; Future    Urinalysis With Culture If Indicated -; Future    C4+C3; Future    Beta-2 Glycoprotein Antibodies; Future    Anticardiolipin Antibody, IgG / M, Qn; Future    Lupus Anticoagulant Reflex; Future    Rheumatoid Factor; Future    Cyclic Citrul Peptide Antibody, IgG / IgA; Future

## 2025-01-28 NOTE — ASSESSMENT & PLAN NOTE
-Mild chronic asymptomatic neutropenia dating back to 2022(follows with Dr. Escobar)  -Labs 8/23/2024: White blood cell 3010 with ANC 1430 lower limit of normal 1700, otherwise unremarkable CBC.  Peripheral smear showed no immature forms and no other abnormalities of Red cell platelets   +MISTY 1:160 titer homogenous pattern, negative CCP antibody, negative HIV, hepatitis panel, EBV  Serum copper normal 104, B12 greater than 2000, folic acid normal 19      She reportedly has had mild neutropenia dating back to around 2022.  She has been asymptomatic from this.  No recurrent infections.  Extensive workup of mild neutropenia with Dr. Escobar has been unrevealing beyond a weakly positive MISTY.  In light of positive MISTY on lab tests, Dr. Escobar is concerned about the possibility of autoimmune disease causing her neutropenia although she has had no clinical features to suggest connective tissue disease, lupus or an inflammatory arthritis.  Her father reportedly had lupus diagnosed late in life.    She denies malar rash, Raynaud's, oral nasal ulcers, pleurisy/pericarditis, renal/hematologic abnormalities beyond neutropenia, seizures, iritis, psoriasis.  No hot swollen joints.  No recurrent blood clots.  No photosensitivity    She slipped while bowling and fell on her outstretched left arm this past Sunday.  She has had persistent left shoulder and left CMC pain since then.  She would like to get x-rays of these joints.  She takes meloxicam for joint pain.  She has known osteoarthritis of the hands      Discussion  -Clinically I think low probability of a underlying inflammatory rheumatic disease to explain her mild chronic neutropenia.  -Actually her November 2024 labs showed normal white blood cell count and no neutropenia.    -Possibly cyclic neutropenia which she is asymptomatic from  -She does have some osteoarthritis hands causing arthralgias, but no synovitis to suggest an inflammatory arthritis    There are no clinical  features of rheumatoid arthritis, psoriatic arthritis, scleroderma, vasculitis, lupus or connective tissue disease.  No Raynaud's, no malar rash, no oral/nasal ulcers, no pleurisy/pericarditis, no seizure disorder, no renal or hematologic abnormality.  No clotting disorder.  No photosensitivity.  No inflammatory arthritis.  No constitutional symptoms    An MISTY test is a nonspecific test.  While it certainly can be positive in conditions like SLE, scleroderma, myositis, Sjogren's, RA, vasculitis, etc., it can also be positive in patients with thyroid disease, type 1 diabetes, psoriasis, celiac disease, inflammatory bowel disease, COPD/chronic lung disease, cancers etc.  There are also reports of normal/apparently healthy individuals who are incidentally found to have a positive MISTY test.  You can also frequently get a false positive MISTY test.  Positive MISTY results increased with age.  15% of patients over the age of 65 are MISTY positive, along with 5% of the general population.    Recommendations    -Obtain further labs as below for workup of positive MISTY in the setting of neutropenia to be certain as possible of suspected false positive MISTY  -X-ray today left shoulder and hands in light of her recent fall on the left shoulder this past Sunday at the [facility]  -Consider physical therapy for the left shoulder, rest and ice  -Continue as needed meloxicam for OA pain  -Follow-up with her orthopedist Dr. Gallardo 1/30 for hand pain and acute left shoulder pain   -Reassurance given to the patient and her  that I do not find clinical evidence of a inflammatory rheumatic disease at this time    Overall low suspicion of systemic inflammatory rheumatic disease at this time.  As long as below rheumatologic labs ordered today come back unremarkable beyond positive MISTY, I will have the patient return on an as-needed basis and continue to follow with their primary provider.  Patient understands that should they have any  rheumatologic concerns in the future to give us a call and I will be happy to re-evaluate.  It was a pleasure to see the patient in clinic today.  Thank you for allowing me to participate in the care of this patient        Orders:    ANCA Panel; Future    MISTY by IFA, Reflex 9-biomarkers profile; Future    CBC Auto Differential; Future    Comprehensive Metabolic Panel; Future    C-reactive Protein; Future    Sedimentation Rate; Future    Urinalysis With Culture If Indicated -; Future    C4+C3; Future    Beta-2 Glycoprotein Antibodies; Future    Anticardiolipin Antibody, IgG / M, Qn; Future    Lupus Anticoagulant Reflex; Future    Rheumatoid Factor; Future    Cyclic Citrul Peptide Antibody, IgG / IgA; Future

## 2025-01-28 NOTE — PATIENT INSTRUCTIONS
"Osteoarthritis    Osteoarthritis is a type of arthritis. It refers to joint pain or joint disease. Osteoarthritis affects tissue that covers the ends of bones in joints (cartilage). Cartilage acts as a cushion between the bones and helps them move smoothly. Osteoarthritis occurs when cartilage in the joints gets worn down. Osteoarthritis is sometimes called \"wear and tear\" arthritis.  Osteoarthritis is the most common form of arthritis. It often occurs in older people. It is a condition that gets worse over time. The joints most often affected by this condition are in the fingers, toes, hips, knees, and spine, including the neck and lower back.    What are the causes?  This condition is caused by the wearing down of cartilage that covers the ends of bones.    What increases the risk?  The following factors may make you more likely to develop this condition:  Being age 50 or older.  Obesity.  Overuse of joints.  Past injury of a joint.  Past surgery on a joint.  Family history of osteoarthritis.    What are the signs or symptoms?  The main symptoms of this condition are pain, swelling, and stiffness in the joint. Other symptoms may include:  An enlarged joint.  More pain and further damage caused by small pieces of bone or cartilage that break off and float inside of the joint.  Small deposits of bone (osteophytes) that grow on the edges of the joint.  A grating or scraping feeling inside the joint when you move it.  Popping or creaking sounds when you move.  Difficulty walking or exercising.  An inability to  items, twist your hand, or control the movements of your hands and fingers.    How is this diagnosed?  This condition may be diagnosed based on:  Your medical history.  A physical exam.  Your symptoms.  X-rays of the affected joints.  Blood tests to rule out other types of arthritis.    How is this treated?  There is no cure for this condition, but treatment can help control pain and improve joint function. " Treatment may include a combination of therapies, such as:  Pain relief techniques, such as:  Applying heat and cold to the joint.  Massage.  A form of talk therapy called cognitive behavioral therapy (CBT). This therapy helps you set goals and follow up on the changes that you make.  Medicines for pain and inflammation. The medicines can be taken by mouth or applied to the skin. They include:  NSAIDs, such as ibuprofen.  Prescription medicines.  Strong anti-inflammatory medicines (corticosteroids).  Certain nutritional supplements.  A prescribed exercise program. You may work with a physical therapist.  Assistive devices, such as a brace, wrap, splint, specialized glove, or cane.  A weight control plan.  Surgery, such as:  An osteotomy. This is done to reposition the bones and relieve pain or to remove loose pieces of bone and cartilage.  Joint replacement surgery. You may need this surgery if you have advanced osteoarthritis.    Follow these instructions at home:    Activity  Rest your affected joints as told by your health care provider.  Exercise as told by your provider. The provider may recommend specific types of exercise, such as:  Strengthening exercises. These are done to strengthen the muscles that support joints affected by arthritis.  Aerobic activities. These are exercises, such as brisk walking or water aerobics, that increase your heart rate.  Range-of-motion activities. These help your joints move more easily.  Balance and agility exercises.    Managing pain, stiffness, and swelling         If told, apply heat to the affected area as often as told by your provider. Use the heat source that your provider recommends, such as a moist heat pack or a heating pad.  If you have a removable assistive device, remove it as told by your provider.  Place a towel between your skin and the heat source. If your provider tells you to keep the assistive device on while you apply heat, place a towel between the  assistive device and the heat source.  Leave the heat on for 20-30 minutes.  If told, put ice on the affected area.  If you have a removable assistive device, remove it as told by your provider.  Put ice in a plastic bag.  Place a towel between your skin and the bag. If your provider tells you to keep the assistive device on during icing, place a towel between the assistive device and the bag.  Leave the ice on for 20 minutes, 2-3 times a day.  If your skin turns bright red, remove the ice or heat right away to prevent skin damage. The risk of damage is higher if you cannot feel pain, heat, or cold.  Move your fingers or toes often to reduce stiffness and swelling.  Raise (elevate) the affected area above the level of your heart while you are sitting or lying down.    General instructions  Take over-the-counter and prescription medicines only as told by your provider.  Maintain a healthy weight. Follow instructions from your provider for weight control.  Do not use any products that contain nicotine or tobacco. These products include cigarettes, chewing tobacco, and vaping devices, such as e-cigarettes. If you need help quitting, ask your provider.  Use assistive devices as told by your provider.    Where to find more information  National [place] of Arthritis and Musculoskeletal and Skin Diseases: niams.nih.gov  National [place] on Aging: jacek.nih.gov  American College of Rheumatology: rheumatology.org    Contact a health care provider if:  You have redness, swelling, or a feeling of warmth in a joint that gets worse.  You have a fever along with joint or muscle aches.  You develop a rash.  You have trouble doing your normal activities.  You have pain that gets worse and is not relieved by pain medicine.    This information is not intended to replace advice given to you by your health care provider. Make sure you discuss any questions you have with your health care provider.  Document Revised: 08/17/2023  Document Reviewed: 08/17/2023  [employer] Patient Education © 2024 [employer] Antinuclear Antibody Test    Why am I having this test?  This is a test that is used to help diagnose systemic lupus erythematosus (SLE) and other autoimmune diseases. An autoimmune disease is a disease in which the body's own defense system (immune system) attacks its organs.  What is being tested?  This test checks for antinuclear antibodies (MISTY) in the blood. The presence of MISTY is associated with several autoimmune diseases. It is seen in almost all people with lupus.  What kind of sample is taken?    A blood sample is required for this test. It is usually collected by inserting a needle into a blood vessel.  How are the results reported?  Your test results will be reported as either positive or negative.  What do the results mean?  A positive test result may mean that you have:  Lupus.  Other autoimmune diseases, such as rheumatoid arthritis, scleroderma, or Sjögren syndrome.  Talk with your health care provider about what your results mean. In some cases, your health care provider may do more testing to confirm the results. More testing may be done because other conditions can sometimes cause a positive result, such as:  Liver dysfunction.  Myasthenia gravis.  Infectious mononucleosis.  Questions to ask your health care provider  Ask your health care provider, or the department that is doing the test:  When will my results be ready?  How will I get my results?  What are my treatment options?  What other tests do I need?  What are my next steps?  Summary  This is a test that is used to help diagnose systemic lupus erythematosus (SLE) and other autoimmune diseases. An autoimmune disease is a disease in which the body's own defense system (immune system) attacks the body.  This test checks for antinuclear antibodies (MISTY) in the blood. The presence of MISTY is associated with several autoimmune diseases. It is seen in almost all people  with lupus.  Your test results will be reported as either positive or negative. Talk with your health care provider about what your results mean.  This information is not intended to replace advice given to you by your health care provider. Make sure you discuss any questions you have with your health care provider.  Document Revised: 08/21/2022 Document Reviewed: 08/21/2022  [employer] Patient Education © 2024 [employer]

## 2025-01-28 NOTE — PROGRESS NOTES
Office Visit       Date: 01/28/2025   Patient Name: Sherrie Harris  MRN: [id number]  YOB: 1970    Referring Physician: Javad Escobar MD     Chief Complaint:   Chief Complaint   Patient presents with    Abnormal Lab     Positive MISTY       History of Present Illness: Sherrie Harris is a 54 y.o. female with history of diabetes, hypertension, left shoulder repair 3/6/2018 who is here today in consultation from hematology oncology Dr. Escobar for adult onset neutropenia with positive MISTY    She reportedly has had mild neutropenia dating back to around 2022.  She has been asymptomatic from this.  No recurrent infections.  Extensive workup of neutropenia with Dr. Escobar has been unrevealing beyond a weakly positive MISTY.  In light of positive MISTY on lab tests, Dr. Escobar is concerned about the possibility of autoimmune disease causing her neutropenia.  Her father reportedly had lupus.    She denies malar rash, Raynaud's, oral nasal ulcers, pleurisy/pericarditis, renal/hematologic abnormalities beyond neutropenia, seizures, iritis, psoriasis.  No hot swollen joints.  No recurrent blood clots.  No photosensitivity    She slipped while bowling and fell on her outstretched left arm this past Sunday.  She has had persistent left shoulder and left CMC pain since then.  She would like to get x-rays of these joints.  She takes meloxicam for joint pain.  She has known osteoarthritis of the hands    Specialist: Orthopedics Dr. Gallardo, hematology Dr. Escobar    Records reviewed:  -Notes from Dr. Escobar hematology oncology  -Labs 8/23/2024: White blood cell 3010 with ANC 1430 lower limit of normal 1700, otherwise unremarkable CBC.  Peripheral smear showed no immature forms and no other abnormalities of Red cell platelets   +MISTY 1:160 titer homogenous pattern, negative CCP antibody, negative HIV, hepatitis panel, EBV  Serum copper normal 104, B12 greater than 2000, folic acid normal 19      Subjective      Review of Systems: Review of Systems   Constitutional:  Negative for chills, fatigue, fever and unexpected weight loss.   HENT:  Negative for mouth sores, sinus pressure and sore throat.    Eyes:  Negative for pain and redness.   Respiratory:  Negative for cough and shortness of breath.    Cardiovascular:  Negative for chest pain.   Gastrointestinal:  Negative for abdominal pain, blood in stool, diarrhea, nausea, vomiting and GERD.   Endocrine: Negative for polydipsia and polyuria.   Genitourinary:  Negative for dysuria, genital sores and hematuria.   Musculoskeletal:  Positive for arthralgias. Negative for back pain, joint swelling, myalgias, neck pain and neck stiffness.   Skin:  Negative for rash and bruise.   Allergic/Immunologic: Negative for immunocompromised state.   Neurological:  Negative for seizures, weakness, numbness and memory problem.   Hematological:  Negative for adenopathy. Does not bruise/bleed easily.   Psychiatric/Behavioral:  Negative for depressed mood. The patient is not nervous/anxious.         Past Medical History:   Past Medical History:   Diagnosis Date    Broken thumb     LEFT THUMB    Diabetes     Hypertension     Left shoulder pain     Palpitations        Past Surgical History:   Past Surgical History:   Procedure Laterality Date    FOOT SURGERY Right     LIPOMA EXCISION  2016    LEFT SHOULDER    ORIF FINGER / THUMB FRACTURE  2004    LEFT THUMB     SHOULDER ARTHROSCOPY Left 03/06/2018    Procedure: LEFT SHOULDER ARTHROSCOPY,  SUBACROMIAL DECOMPRESSION,  DISTAL CLAVICLE EXCISION,  BICEPS TENOTOMY, and LABRAL DEBRIDEMENT;  Surgeon: TITUS Huntley MD;  Location: [facility] OR [facility];  Service:     UTERINE FIBROID SURGERY         Family History:   Family History   Problem Relation Age of Onset    Diabetes Mother     Hypertension Mother     Heart failure Mother     Hypertension Father     Lupus Father     Malig Hyperthermia Neg Hx        Social History:   Social History     Socioeconomic  "History    Marital status: Single   Tobacco Use    Smoking status: Never    Smokeless tobacco: Never   Vaping Use    Vaping status: Never Used   Substance and Sexual Activity    Alcohol use: Yes     Comment: OCCASIONAL    Drug use: No    Sexual activity: Defer       Medications:   Current Outpatient Medications:     amLODIPine (NORVASC) 5 MG tablet, Take 1 tablet by mouth Daily., Disp: 90 tablet, Rfl: 3    fluticasone (FLONASE) 50 MCG/ACT nasal spray, Administer 2 sprays into the nostril(s) as directed by provider Daily., Disp: 16 g, Rfl: 2    hydroCHLOROthiazide 25 MG tablet, Take 1 tablet by mouth Daily., Disp: 90 tablet, Rfl: 3    meloxicam (Mobic) 7.5 MG tablet, Take 1 tablet by mouth Daily., Disp: 30 tablet, Rfl: 2    metFORMIN (GLUCOPHAGE) 500 MG tablet, Take 1 tablet by mouth 2 (Two) Times a Day With Meals., Disp: 60 tablet, Rfl: 3    VITAMIN D PO, Take 3,000 Units by mouth., Disp: , Rfl:     Allergies:   Allergies   Allergen Reactions    Amoxicillin-Pot Clavulanate Hives       I have reviewed and updated the patient's chief complaint, history of present illness, review of systems, past medical history, surgical history, family history, social history, medications and allergy list as appropriate.     Objective      Vital Signs:   Vitals:    01/28/25 1449   BP: 132/88   BP Location: Right arm   Patient Position: Sitting   Cuff Size: Adult   Pulse: 75   Temp: 97.3 °F (36.3 °C)   Weight: 70.3 kg (154 lb 14.4 oz)   Height: 172.7 cm (67.99\")   PainSc: 0-No pain     Body mass index is 23.56 kg/m².       Physical Exam:  Physical Exam   MUSCULOSKELETAL:   No peripheral synovitis.  No rheumatoid nodules or tophi.  No dactylitis.  No pitting of the nail.  Normal capillaroscopy.  Tender diminished range of motion motion left shoulder  OA changes hands with Heberden Robby's nodes.  Squaring of the CMC joints.    Complete joint exam was performed including the MCPs, PIPs, DIPs of the hands, wrists, elbows, shoulders, " "hips, knees and ankles.  No soft tissue swelling or tenderness is present except as above.    General: The patient is well-developed and well nourished. Cooperative, alert and oriented. Affect is normal. Hydration appears normal.   HEENT: Normocephalic and atraumatic. Lids and conjunctiva are normal. Pupils are equal and sclera are clear. Oropharynx is clear   NECK neck is supple without adenopathy, masses or thyromegaly.   CARDIOVASCULAR: Regular rate and rhythm. No murmurs, rubs or gallops   LUNGS: Effort is normal. Lungs are clear bilateral   ABDOMEN: Not examined  EXTREMITIES: Peripheral pulses are intact. No clubbing.   SKIN: No rashes. No subcutaneous nodules. No digital ulcers. No sclerodactyly.   NEUROLOGIC: Gait is normal. Strength testing is normal.  No focal neurologic deficits    Results Review:   Labs:    Lab Results   Component Value Date    GLUCOSE 159 (H) 11/04/2024    BUN 16 11/04/2024    CREATININE 0.65 11/04/2024    EGFRRESULT 104 11/04/2024    EGFR 103.6 10/05/2023    BCR 25 (H) 11/04/2024    K 4.3 11/04/2024    CO2 27 11/04/2024    CALCIUM 9.9 11/04/2024    PROTENTOTREF 7.2 11/04/2024    ALBUMIN 4.4 11/04/2024    BILITOT 0.4 11/04/2024    AST 29 11/04/2024    ALT 16 11/04/2024     Lab Results   Component Value Date    WBC 4.1 11/04/2024    HGB 12.2 11/04/2024    HCT 39.3 11/04/2024    MCV 90 11/04/2024     11/04/2024     No results found for: \"SEDRATE\"  No results found for: \"CRP\"  No results found for: \"QUANTIFERO\", \"QUANTITB1\", \"QUANTITB2\", \"QUANTIFERN\", \"QUANTIFERM\", \"QUANTITBGLDP\"  No results found for: \"RF\"  Lab Results   Component Value Date    HEPBSAG Non-Reactive 08/23/2024    HEPBSAG Negative 08/23/2024    HEPAIGM Non-Reactive 08/23/2024    HEPBIGMCORE Non-Reactive 08/23/2024    HEPCVIRUSABY Non-Reactive 08/23/2024           Procedures    Assessment / Plan      Assessment & Plan  MISTY positive  -Mild chronic asymptomatic neutropenia dating back to 2022(follows with  " Luz)  -Labs 8/23/2024: White blood cell 3010 with ANC 1430 lower limit of normal 1700, otherwise unremarkable CBC.  Peripheral smear showed no immature forms and no other abnormalities of Red cell platelets   +MISTY 1:160 titer homogenous pattern, negative CCP antibody, negative HIV, hepatitis panel, EBV  Serum copper normal 104, B12 greater than 2000, folic acid normal 19      She reportedly has had mild neutropenia dating back to around 2022.  She has been asymptomatic from this.  No recurrent infections.  Extensive workup of mild neutropenia with Dr. Escobar has been unrevealing beyond a weakly positive MISTY.  In light of positive MISTY on lab tests, Dr. Escobar is concerned about the possibility of autoimmune disease causing her neutropenia although she has had no clinical features to suggest connective tissue disease, lupus or an inflammatory arthritis.  Her father reportedly had lupus diagnosed late in life.    She denies malar rash, Raynaud's, oral nasal ulcers, pleurisy/pericarditis, renal/hematologic abnormalities beyond neutropenia, seizures, iritis, psoriasis.  No hot swollen joints.  No recurrent blood clots.  No photosensitivity    She slipped while bowling and fell on her outstretched left arm this past Sunday.  She has had persistent left shoulder and left CMC pain since then.  She would like to get x-rays of these joints.  She takes meloxicam for joint pain.  She has known osteoarthritis of the hands      Discussion  -Clinically I think low probability of a underlying inflammatory rheumatic disease to explain her mild chronic neutropenia.  -Actually her November 2024 labs showed normal white blood cell count and no neutropenia.    -Possibly cyclic neutropenia which she is asymptomatic from  -She does have some osteoarthritis hands causing arthralgias, but no synovitis to suggest an inflammatory arthritis    There are no clinical features of rheumatoid arthritis, psoriatic arthritis, scleroderma, vasculitis,  lupus or connective tissue disease.  No Raynaud's, no malar rash, no oral/nasal ulcers, no pleurisy/pericarditis, no seizure disorder, no renal or hematologic abnormality.  No clotting disorder.  No photosensitivity.  No inflammatory arthritis.  No constitutional symptoms    An MISTY test is a nonspecific test.  While it certainly can be positive in conditions like SLE, scleroderma, myositis, Sjogren's, RA, vasculitis, etc., it can also be positive in patients with thyroid disease, type 1 diabetes, psoriasis, celiac disease, inflammatory bowel disease, COPD/chronic lung disease, cancers etc.  There are also reports of normal/apparently healthy individuals who are incidentally found to have a positive MISTY test.  You can also frequently get a false positive MISTY test.  Positive MISTY results increased with age.  15% of patients over the age of 65 are MISTY positive, along with 5% of the general population.    Recommendations    -Obtain further labs as below for workup of positive MISTY in the setting of neutropenia to be certain as possible of suspected false positive MISTY  -X-ray today left shoulder and hands in light of her recent fall on the left shoulder this past Sunday at the [facility]  -Consider physical therapy for the left shoulder, rest and ice  -Continue as needed meloxicam for OA pain  -Follow-up with her orthopedist Dr. Gallardo 1/30 for hand pain and acute left shoulder pain   -Reassurance given to the patient and her  that I do not find clinical evidence of a inflammatory rheumatic disease at this time    Overall low suspicion of systemic inflammatory rheumatic disease at this time.  As long as below rheumatologic labs ordered today come back unremarkable beyond positive MISTY, I will have the patient return on an as-needed basis and continue to follow with their primary provider.  Patient understands that should they have any rheumatologic concerns in the future to give us a call and I will be happy to  re-evaluate.  It was a pleasure to see the patient in clinic today.  Thank you for allowing me to participate in the care of this patient        Orders:    ANCA Panel; Future    MISTY by IFA, Reflex 9-biomarkers profile; Future    CBC Auto Differential; Future    Comprehensive Metabolic Panel; Future    C-reactive Protein; Future    Sedimentation Rate; Future    Urinalysis With Culture If Indicated -; Future    C4+C3; Future    Beta-2 Glycoprotein Antibodies; Future    Anticardiolipin Antibody, IgG / M, Qn; Future    Lupus Anticoagulant Reflex; Future    Rheumatoid Factor; Future    Cyclic Citrul Peptide Antibody, IgG / IgA; Future    Neutropenia, unspecified type  -Mild and asymptomatic  Orders:    ANCA Panel; Future    MISTY by IFA, Reflex 9-biomarkers profile; Future    CBC Auto Differential; Future    Comprehensive Metabolic Panel; Future    C-reactive Protein; Future    Sedimentation Rate; Future    Urinalysis With Culture If Indicated -; Future    C4+C3; Future    Beta-2 Glycoprotein Antibodies; Future    Anticardiolipin Antibody, IgG / M, Qn; Future    Lupus Anticoagulant Reflex; Future    Rheumatoid Factor; Future    Cyclic Citrul Peptide Antibody, IgG / IgA; Future    Arthralgia of multiple sites    Orders:    XR Hand 2 View Bilateral    XR Shoulder 2+ View Left    Generalized osteoarthrosis, involving multiple sites  -Followed by orthopedics Dr. Gallardo       Acute pain of left shoulder         TIME SPENT: I spent 45 minutes caring for the patient on this date of service.  This time includes time spent by me in the following activities: Preparing for the visit, obtaining records, reviewing/ordering tests and independently reviewing results, performing a medically appropriate history/exam, counseling and educating the patient/family/caregiver, ordering medications, tests, or procedures, and documenting information in the medical record.    Follow Up:   Return if symptoms worsen or fail to improve.         Brian Sanchez MD  [facility]

## 2025-01-29 LAB
BACTERIA UR QL AUTO: NORMAL /HPF
HYALINE CASTS UR QL AUTO: NORMAL /LPF
RBC # UR STRIP: NORMAL /HPF
REF LAB TEST METHOD: NORMAL
SQUAMOUS #/AREA URNS HPF: NORMAL /HPF
WBC # UR STRIP: NORMAL /HPF

## 2025-01-30 ENCOUNTER — OFFICE VISIT (OUTPATIENT)
Age: 55
End: 2025-01-30
Payer: MEDICAID

## 2025-01-30 VITALS
SYSTOLIC BLOOD PRESSURE: 154 MMHG | WEIGHT: 158.7 LBS | HEIGHT: 68 IN | DIASTOLIC BLOOD PRESSURE: 96 MMHG | BODY MASS INDEX: 24.05 KG/M2

## 2025-01-30 DIAGNOSIS — M18.12 ARTHRITIS OF CARPOMETACARPAL (CMC) JOINT OF LEFT THUMB: Primary | ICD-10-CM

## 2025-01-30 LAB
CARDIOLIPIN IGG SER IA-ACNC: <9 GPL U/ML (ref 0–14)
CARDIOLIPIN IGM SER IA-ACNC: 89 MPL U/ML (ref 0–12)
CCP IGA+IGG SERPL IA-ACNC: 28 UNITS (ref 0–19)

## 2025-01-30 RX ORDER — TRIAMCINOLONE ACETONIDE 40 MG/ML
20 INJECTION, SUSPENSION INTRA-ARTICULAR; INTRAMUSCULAR
Status: COMPLETED | OUTPATIENT
Start: 2025-01-30 | End: 2025-01-30

## 2025-01-30 RX ORDER — LIDOCAINE HYDROCHLORIDE 10 MG/ML
0.5 INJECTION, SOLUTION EPIDURAL; INFILTRATION; INTRACAUDAL; PERINEURAL
Status: COMPLETED | OUTPATIENT
Start: 2025-01-30 | End: 2025-01-30

## 2025-01-30 RX ADMIN — TRIAMCINOLONE ACETONIDE 20 MG: 40 INJECTION, SUSPENSION INTRA-ARTICULAR; INTRAMUSCULAR at 08:18

## 2025-01-30 RX ADMIN — LIDOCAINE HYDROCHLORIDE 0.5 ML: 10 INJECTION, SOLUTION EPIDURAL; INFILTRATION; INTRACAUDAL; PERINEURAL at 08:18

## 2025-01-30 NOTE — PROGRESS NOTES
Procedure   - Small Joint Arthrocentesis: L thumb CMC on 1/30/2025 8:18 AM  Indications: pain  Details: 25 G (Long  ) needle, radial approach  Medications: 0.5 mL lidocaine PF 1% 1 %; 20 mg triamcinolone acetonide 40 MG/ML  Outcome: tolerated well, no immediate complications  Procedure, treatment alternatives, risks and benefits explained, specific risks discussed. Consent was given by the patient. Immediately prior to procedure a time out was called to verify the correct patient, procedure, equipment, support staff and site/side marked as required. Patient was prepped and draped in the usual sterile fashion.

## 2025-01-30 NOTE — PROGRESS NOTES
[facility]     Office Visit       Date: 01/30/2025   Patient Name: Sherrie Harris  MRN: [id number]  YOB: 1970    Referring Physician: Johnny Sanchez AP*     Chief Complaint: No chief complaint on file.      History of Present Illness:   Sherrie Harris is a 54 y.o. female right-hand-dominant presents for evaluation of left basilar thumb pain.  She fell while bowling with her grandson on 1/26/2025.  Reports she has had left basilar thumb pain left shoulder pain since then.  Reports the pain is an 8 out of 10.  Reports difficulty raising her shoulder overhead.  She also reports left basilar thumb pain is worse with gripping and activity.  Is worse at work.  She works at [employer].  She denies smoking.      Subjective   Review of Systems:   Review of Systems   Constitutional:  Negative for chills, fever, unexpected weight gain and unexpected weight loss.   HENT:  Negative for congestion, postnasal drip and rhinorrhea.    Eyes:  Negative for blurred vision.   Respiratory:  Negative for shortness of breath.    Cardiovascular:  Negative for leg swelling.   Gastrointestinal:  Negative for abdominal pain, nausea and vomiting.   Genitourinary:  Negative for difficulty urinating.   Musculoskeletal:  Positive for arthralgias. Negative for gait problem, joint swelling and myalgias.   Skin:  Negative for skin lesions and wound.   Neurological:  Negative for dizziness, weakness, light-headedness and numbness.   Hematological:  Does not bruise/bleed easily.   Psychiatric/Behavioral:  Negative for depressed mood.         Pertinent review of systems per HPI.     I reviewed the patient's chief complaint, history of present illness, review of systems, past medical history, surgical history, family history, social history, medications and allergy list in the EMR on 01/30/2025 and agree with the findings above.    Objective   "  Vital Signs:   Vitals:    01/30/25 0756   BP: 154/96   Weight: 72 kg (158 lb 11.2 oz)   Height: 171.5 cm (67.5\")     BMI: Body mass index is 24.49 kg/m².    General Appearance: No acute distress. Alert and oriented.     Chest:  Non-labored breathing on room air. Regular rate and rhythm.    Upper Extremity Exam:    Swelling of the left thumb CMC.  Tender to palpation.  Positive shuck test.  Negative grind test.    Tender palpation over the last shoulder particular over the AC joint.    Fingers are warm, well-perfused with appropriate capillary refill.  Palpable radial pulse.    Sensation intact to light touch in median, radial and ulnar nerve distributions.    Motor- Fires [employer], ulnar intrinsics, EPL/EDC w/ full active and passive range of motion. Strength intact.    Non-tender except for in the areas highlighted    Imaging/Studies:   Imaging Results (Last 24 Hours)       ** No results found for the last 24 hours. **            X-ray of the bilateral hands from 1/20/2025 independently reviewed and interpreted myself and demonstrate evidence of left thumb CMC arthritis moderate degree    Procedures:  Procedures    Quality Measures:   ACP:   ACP discussion was deferred.    Tobacco:   Sherrie Harris  reports that she has never smoked. She has never used smokeless tobacco.      Assessment / Plan    Assessment/Plan:     There are no diagnoses linked to this encounter.     Sehrrie Harrisis a 54 y.o. female who presents with:      ICD-10-CM ICD-9-CM   1. Arthritis of carpometacarpal (CMC) joint of left thumb  M18.12 716.94         Patient presents with left basilar thumb pain after a fall 4 days ago.  No evidence of bony injury on x-ray however she does have left thumb CMC arthritis that has been aggravated by her fall.  We discussed the diagnosis of CMC arthritis as well as treatment options including anti-inflammatories, bracing, corticosteroid injection and surgery.  Recommend left thumb Comfort Cool brace and " left thumb CMC injection today.  Follow-up as needed.    She also has left shoulder pain after a fall.  Recommend follow-up with my Dr. Clinton for evaluation of the left shoulder    Procedure Note:    I discussed with the patient the potential benefits of performing therapeutic aspiration and injections as well as potential risks including but not limited to infection, swelling, pain, bleeding, bruising, nerve/vessel damage, skin color changes, transient elevation in blood glucose levels, and fat atrophy. After informed consent and after the areas were prepped with chlorhexadine soap, ethyl chloride was used to numb the skin. Via the dorsal approach, 0.5 mL of 1% lidocaine was injected followed by injection of 20mg of Kenalog into the left thumb CMC joint.  The patient tolerated the procedure well. There were no complications. A sterile dressing was placed over the injection sites.      Follow Up:   Return if symptoms worsen or fail to improve.        Óscar Gallardo MD  [facility]

## 2025-01-31 ENCOUNTER — TELEPHONE (OUTPATIENT)
Age: 55
End: 2025-01-31
Payer: MEDICAID

## 2025-01-31 LAB
ANA HOMOGEN TITR SER: ABNORMAL {TITER}
ANA SER QL IF: POSITIVE
APTT HEX PL PPP: 9 SEC (ref 0–11)
B2 GLYCOPROT1 IGA SER-ACNC: 15 GPI IGA UNITS (ref 0–25)
B2 GLYCOPROT1 IGG SER-ACNC: <9 GPI IGG UNITS (ref 0–20)
B2 GLYCOPROT1 IGM SER-ACNC: >150 GPI IGM UNITS (ref 0–32)
C-ANCA TITR SER IF: ABNORMAL TITER
CENTROMERE B AB SER-ACNC: <0.2 AI (ref 0–0.9)
CHROMATIN AB SERPL-ACNC: <0.2 AI (ref 0–0.9)
DSDNA AB SER-ACNC: <1 IU/ML (ref 0–9)
ENA JO1 AB SER-ACNC: <0.2 AI (ref 0–0.9)
ENA RNP AB SER-ACNC: <0.2 AI (ref 0–0.9)
ENA SCL70 AB SER-ACNC: <0.2 AI (ref 0–0.9)
ENA SM AB SER-ACNC: <0.2 AI (ref 0–0.9)
ENA SS-A AB SER-ACNC: <0.2 AI (ref 0–0.9)
ENA SS-B AB SER-ACNC: <0.2 AI (ref 0–0.9)
LA 2 SCREEN W REFLEX-IMP: ABNORMAL
LABORATORY COMMENT REPORT: ABNORMAL
Lab: ABNORMAL
Lab: ABNORMAL
MIXING APTT: 44.4 SEC (ref 0–40.5)
MYELOPEROXIDASE AB SER IA-ACNC: <0.2 UNITS (ref 0–0.9)
P-ANCA ATYPICAL TITR SER IF: ABNORMAL TITER
P-ANCA TITR SER IF: ABNORMAL TITER
PROTEINASE3 AB SER IA-ACNC: <0.2 UNITS (ref 0–0.9)
SCREEN APTT: 50.4 SEC (ref 0–43.5)
SCREEN DRVVT: 41.1 SEC (ref 0–47)

## 2025-02-03 ENCOUNTER — OFFICE VISIT (OUTPATIENT)
Dept: FAMILY MEDICINE CLINIC | Facility: CLINIC | Age: 55
End: 2025-02-03
Payer: MEDICAID

## 2025-02-03 VITALS
TEMPERATURE: 97.8 F | OXYGEN SATURATION: 99 % | HEART RATE: 72 BPM | DIASTOLIC BLOOD PRESSURE: 84 MMHG | SYSTOLIC BLOOD PRESSURE: 144 MMHG | HEIGHT: 68 IN | BODY MASS INDEX: 23.04 KG/M2 | WEIGHT: 152 LBS

## 2025-02-03 DIAGNOSIS — J06.9 ACUTE URI: Primary | ICD-10-CM

## 2025-02-03 LAB
EXPIRATION DATE: NORMAL
EXPIRATION DATE: NORMAL
FLUAV AG UPPER RESP QL IA.RAPID: NOT DETECTED
FLUBV AG UPPER RESP QL IA.RAPID: NOT DETECTED
INTERNAL CONTROL: NORMAL
INTERNAL CONTROL: NORMAL
Lab: NORMAL
Lab: NORMAL
S PYO AG THROAT QL: NEGATIVE
SARS-COV-2 AG UPPER RESP QL IA.RAPID: NOT DETECTED

## 2025-02-03 PROCEDURE — 3077F SYST BP >= 140 MM HG: CPT | Performed by: NURSE PRACTITIONER

## 2025-02-03 PROCEDURE — 1160F RVW MEDS BY RX/DR IN RCRD: CPT | Performed by: NURSE PRACTITIONER

## 2025-02-03 PROCEDURE — 99213 OFFICE O/P EST LOW 20 MIN: CPT | Performed by: NURSE PRACTITIONER

## 2025-02-03 PROCEDURE — 87428 SARSCOV & INF VIR A&B AG IA: CPT | Performed by: NURSE PRACTITIONER

## 2025-02-03 PROCEDURE — 87880 STREP A ASSAY W/OPTIC: CPT | Performed by: NURSE PRACTITIONER

## 2025-02-03 PROCEDURE — 1159F MED LIST DOCD IN RCRD: CPT | Performed by: NURSE PRACTITIONER

## 2025-02-03 PROCEDURE — 3079F DIAST BP 80-89 MM HG: CPT | Performed by: NURSE PRACTITIONER

## 2025-02-03 PROCEDURE — 1126F AMNT PAIN NOTED NONE PRSNT: CPT | Performed by: NURSE PRACTITIONER

## 2025-02-03 RX ORDER — DEXTROMETHORPHAN HYDROBROMIDE AND PROMETHAZINE HYDROCHLORIDE 15; 6.25 MG/5ML; MG/5ML
5 SYRUP ORAL 3 TIMES DAILY PRN
Qty: 120 ML | Refills: 0 | Status: SHIPPED | OUTPATIENT
Start: 2025-02-03

## 2025-02-03 NOTE — PROGRESS NOTES
"Chief Complaint  Headache (Sore throat,cough,bodyaches,  x1 day./Grandchildren have the flu)    Subjective          Sherrie Harris presents to [facility]  Headache    History of Present Illness  The patient is a 54-year-old female who presents for evaluation of cough and sore throat.    She began experiencing symptoms yesterday, which include body aches, intermittent loose cough, headache, and a sore throat. She has been in close contact with her grandchildren, who have recently recovered from influenza. She also reports experiencing chills. She has been managing her symptoms with Tylenol and an over-the-counter cold and flu medication.    MEDICATIONS  Tylenol    IMMUNIZATIONS  She has received the influenza vaccine.    Objective   Vital Signs:   /84   Pulse 72   Temp 97.8 °F (36.6 °C) (Oral)   Ht 171.5 cm (67.5\")   Wt 68.9 kg (152 lb)   SpO2 99%   BMI 23.46 kg/m²     Body mass index is 23.46 kg/m².    Review of Systems   Constitutional:  Positive for chills.   HENT:  Positive for congestion and sore throat. Negative for ear pain, sinus pressure, swollen glands and trouble swallowing.    Respiratory:  Positive for cough. Negative for shortness of breath and wheezing.    Cardiovascular:  Negative for chest pain.   Gastrointestinal:  Negative for abdominal pain, diarrhea, nausea and vomiting.   Genitourinary:  Negative for dysuria.   Musculoskeletal:  Negative for back pain.   Skin:  Negative for rash.   Neurological:  Negative for headache.       Past History:  Medical History: has a past medical history of Broken thumb, Diabetes, Hypertension, Left shoulder pain, and Palpitations.   Surgical History: has a past surgical history that includes Lipoma Excision (2016); Uterine fibroid surgery; ORIF finger / thumb fracture (2004); Shoulder arthroscopy (Left, 03/06/2018); and Foot surgery (Right).   Family History: family history includes Diabetes in her mother; Heart failure " in her mother; Hypertension in her father and mother; Lupus in her father.   Social History: reports that she has never smoked. She has never been exposed to tobacco smoke. She has never used smokeless tobacco. She reports current alcohol use. She reports that she does not use drugs.    PHQ-2 Depression Screening  Little interest or pleasure in doing things? Not at all   Feeling down, depressed, or hopeless? Not at all   PHQ-2 Total Score 0       PHQ-9 Depression Screening  Little interest or pleasure in doing things? Not at all   Feeling down, depressed, or hopeless? Not at all   PHQ-2 Total Score 0   Trouble falling or staying asleep, or sleeping too much?     Feeling tired or having little energy?     Poor appetite or overeating?     Feeling bad about yourself - or that you are a failure or have let yourself or your family down?     Trouble concentrating on things, such as reading the newspaper or watching television?     Moving or speaking so slowly that other people could have noticed? Or the opposite - being so fidgety or restless that you have been moving around a lot more than usual?     Thoughts that you would be better off dead, or of hurting yourself in some way?     PHQ-9 Total Score     If you checked off any problems, how difficult have these problems made it for you to do your work, take care of things at home, or get along with other people? Not difficult at all        PHQ-9 Total Score:        Patient screened positive for depression based on a PHQ-9 score of  on . Follow-up recommendations include:          Current Outpatient Medications:     amLODIPine (NORVASC) 5 MG tablet, Take 1 tablet by mouth Daily., Disp: 90 tablet, Rfl: 3    fluticasone (FLONASE) 50 MCG/ACT nasal spray, Administer 2 sprays into the nostril(s) as directed by provider Daily., Disp: 16 g, Rfl: 2    hydroCHLOROthiazide 25 MG tablet, Take 1 tablet by mouth Daily., Disp: 90 tablet, Rfl: 3    meloxicam (Mobic) 7.5 MG tablet, Take  1 tablet by mouth Daily., Disp: 30 tablet, Rfl: 2    metFORMIN (GLUCOPHAGE) 500 MG tablet, Take 1 tablet by mouth 2 (Two) Times a Day With Meals., Disp: 60 tablet, Rfl: 3    VITAMIN D PO, Take 3,000 Units by mouth., Disp: , Rfl:     promethazine-dextromethorphan (PROMETHAZINE-DM) 6.25-15 MG/5ML syrup, Take 5 mL by mouth 3 (Three) Times a Day As Needed for Cough., Disp: 120 mL, Rfl: 0   (Not in a hospital admission)     Allergies: Amoxicillin-pot clavulanate    Physical Exam  Constitutional:       Appearance: Normal appearance.   HENT:      Right Ear: Tympanic membrane, ear canal and external ear normal.      Left Ear: Tympanic membrane, ear canal and external ear normal.      Nose: Nose normal.      Mouth/Throat:      Mouth: Mucous membranes are moist.      Pharynx: Oropharynx is clear.   Eyes:      Conjunctiva/sclera: Conjunctivae normal.      Pupils: Pupils are equal, round, and reactive to light.   Cardiovascular:      Rate and Rhythm: Normal rate and regular rhythm.      Heart sounds: Normal heart sounds.   Pulmonary:      Effort: Pulmonary effort is normal. No respiratory distress.      Breath sounds: Normal breath sounds. No wheezing, rhonchi or rales.   Neurological:      General: No focal deficit present.      Mental Status: She is alert and oriented to person, place, and time. Mental status is at baseline.   Psychiatric:         Mood and Affect: Mood normal.         Behavior: Behavior normal.         Thought Content: Thought content normal.         Judgment: Judgment normal.        Physical Exam  Lungs were auscultated.    Result Review :          Results               Assessment and Plan    Diagnoses and all orders for this visit:    1. Acute URI (Primary)  -     Covid-19 + Flu A&B AG, Veritor; Future  -     POC Rapid Strep A; Future  -     promethazine-dextromethorphan (PROMETHAZINE-DM) 6.25-15 MG/5ML syrup; Take 5 mL by mouth 3 (Three) Times a Day As Needed for Cough.  Dispense: 120 mL; Refill: 0  -      Covid-19 + Flu A&B AG, Veritor  -     POC Rapid Strep A  -     Throat / Upper Respiratory Culture - Swab, Throat      Assessment & Plan  1. Influenza-like symptoms.  She reports body aches, a sore throat, and a loose cough that started yesterday. She has been around her grandchildren who had the flu. She has been taking Tylenol and over-the-counter cold and flu medication. No fever is present currently, likely due to Tylenol use. Swabs for COVID-19, influenza, and strep will be conducted to confirm the diagnosis. All tests negative today. Throat culture sent, call in 2 days for results.  Tylenol as needed for pain fever.  Fluids and rest encouraged.  Risk of meds discussed understood.  Education provided.  Viral versus bacterial discussed and likely viral.  Promethazine DM as needed for cough, monitor for sedation.  Risk of meds discussed understood.  Return in 5 days if no improvement, sooner if worsens.  Return to clinic or ED with any issues or concerns.    Blood pressure mildly elevated.  Goal blood pressure less than 140/90.  Let me know if does not come down to below the goal.    Patient has appointment with me on 2/10/2025 for routine checkup and states she will discuss all preventative measures at that time.  Wants to focus on sick visit today.            BMI is within normal parameters. No other follow-up for BMI required.       Follow Up   Return in about 2 weeks (around 2/17/2025), or if symptoms worsen or fail to improve, for Check up and labs due .  Patient was given instructions and counseling regarding her condition or for health maintenance advice. Please see specific information pulled into the AVS if appropriate.     Patient or patient representative verbalized consent for the use of Ambient Listening during the visit with  MARCIE Stacy for chart documentation. 2/3/2025  08:57 EST    MARCIE Stacy

## 2025-02-04 ENCOUNTER — TELEPHONE (OUTPATIENT)
Age: 55
End: 2025-02-04
Payer: MEDICAID

## 2025-02-06 ENCOUNTER — TELEPHONE (OUTPATIENT)
Dept: FAMILY MEDICINE CLINIC | Facility: CLINIC | Age: 55
End: 2025-02-06
Payer: MEDICAID

## 2025-02-06 LAB
BACTERIA SPEC RESP CULT: NORMAL
BACTERIA SPEC RESP CULT: NORMAL

## 2025-02-06 NOTE — TELEPHONE ENCOUNTER
LVM for pt to call us back for results    Hub to relay:  Please let patient know throat culture came back negative.  Thanks

## 2025-02-06 NOTE — TELEPHONE ENCOUNTER
Name: Sherrie Harris      Relationship: Self      HUB PROVIDED THE RELAY MESSAGE FROM THE OFFICE      PATIENT: VOICED UNDERSTANDING AND HAS NO FURTHER QUESTIONS AT THIS TIME    ADDITIONAL INFORMATION:

## 2025-02-10 ENCOUNTER — OFFICE VISIT (OUTPATIENT)
Dept: FAMILY MEDICINE CLINIC | Facility: CLINIC | Age: 55
End: 2025-02-10
Payer: MEDICAID

## 2025-02-10 VITALS
BODY MASS INDEX: 23.87 KG/M2 | HEART RATE: 70 BPM | DIASTOLIC BLOOD PRESSURE: 84 MMHG | HEIGHT: 68 IN | OXYGEN SATURATION: 99 % | SYSTOLIC BLOOD PRESSURE: 136 MMHG | WEIGHT: 157.5 LBS

## 2025-02-10 DIAGNOSIS — Z12.31 ENCOUNTER FOR SCREENING MAMMOGRAM FOR MALIGNANT NEOPLASM OF BREAST: ICD-10-CM

## 2025-02-10 DIAGNOSIS — Z13.21 ENCOUNTER FOR VITAMIN DEFICIENCY SCREENING: ICD-10-CM

## 2025-02-10 DIAGNOSIS — I10 BENIGN ESSENTIAL HTN: Primary | ICD-10-CM

## 2025-02-10 DIAGNOSIS — Z12.11 SCREENING FOR COLON CANCER: ICD-10-CM

## 2025-02-10 DIAGNOSIS — M06.9 RHEUMATOID ARTHRITIS, INVOLVING UNSPECIFIED SITE, UNSPECIFIED WHETHER RHEUMATOID FACTOR PRESENT: ICD-10-CM

## 2025-02-10 DIAGNOSIS — Z79.899 ENCOUNTER FOR LONG-TERM (CURRENT) USE OF MEDICATIONS: ICD-10-CM

## 2025-02-10 DIAGNOSIS — E11.9 TYPE 2 DIABETES MELLITUS WITHOUT COMPLICATION, WITHOUT LONG-TERM CURRENT USE OF INSULIN: ICD-10-CM

## 2025-02-10 PROCEDURE — 3075F SYST BP GE 130 - 139MM HG: CPT | Performed by: NURSE PRACTITIONER

## 2025-02-10 PROCEDURE — 1159F MED LIST DOCD IN RCRD: CPT | Performed by: NURSE PRACTITIONER

## 2025-02-10 PROCEDURE — 3079F DIAST BP 80-89 MM HG: CPT | Performed by: NURSE PRACTITIONER

## 2025-02-10 PROCEDURE — 1160F RVW MEDS BY RX/DR IN RCRD: CPT | Performed by: NURSE PRACTITIONER

## 2025-02-10 PROCEDURE — 99214 OFFICE O/P EST MOD 30 MIN: CPT | Performed by: NURSE PRACTITIONER

## 2025-02-10 PROCEDURE — 1126F AMNT PAIN NOTED NONE PRSNT: CPT | Performed by: NURSE PRACTITIONER

## 2025-02-10 NOTE — PROGRESS NOTES
Chief Complaint  labs    Subjective          Sherrie Harris presents to [facility]  History of Present Illness  History of Present Illness  The patient is a 54-year-old female who presents for a 6-month follow-up of diabetes and hypertension. Her blood pressure is well controlled on amlodipine and hydrochlorothiazide, and her diabetes is well managed with metformin. She maintains an active lifestyle and adheres to a healthy diet. She is asymptomatic.    She reports an improvement in her overall health status. She continues to engage in physical activities, including daily walks and cycling approximately 10 miles on a stationary bike. However, due to a recent illness, she was unable to maintain this routine last week. States the illness has resolved. She has been adhering to a healthy diet, avoiding processed meats and fried foods, and consuming lean meats such as chicken and beef.    She has not yet undergone a mammogram and states she has cologuard at home that she will complete. She had an eye examination within the past year.    She saw Dr. Sanchez, rheumatologist, and he did lab work which showed her potassium was low at 3.4. She is scheduled to see him again in June 2025. They initially thought she had lupus, but after evaluation, it was determined that she does not have it. Her biggest fear was that she might be developing rheumatoid arthritis. Before the blood work was done, Dr. Sanchez said that was not the case. However, when they called back with the lab results, they indicated early stages of rheumatoid arthritis. Her other major concern is that she works with her hands all night long and fears she will eventually be unable to do so. Dr. Sanchez mentioned that there is medication available for early stages of rheumatoid arthritis, but she is unsure what kind of medicine he was referring to.    MEDICATIONS  Amlodipine, hydrochlorothiazide, metformin.    Patient Care  "Team:  Johnny Sanchez APRN as PCP - General (Nurse Practitioner)  Óscar Gallardo MD as Consulting Physician (Orthopedic Surgery)  Brian Sanchez MD as Consulting Physician (Rheumatology)     Objective   Vital Signs:   /84   Pulse 70   Ht 171.5 cm (67.5\")   Wt 71.4 kg (157 lb 8 oz)   SpO2 99%   BMI 24.30 kg/m²     Body mass index is 24.3 kg/m².    Review of Systems   Constitutional:  Negative for chills, fatigue and fever.   HENT:  Negative for congestion.    Eyes:  Negative for visual disturbance.   Respiratory:  Negative for cough, shortness of breath and wheezing.    Cardiovascular: Negative.    Gastrointestinal:  Negative for abdominal pain, constipation, diarrhea, nausea and vomiting.   Endocrine: Negative for polydipsia, polyphagia and polyuria.   Genitourinary:  Negative for decreased urine volume, dysuria, frequency, hematuria and urgency.   Musculoskeletal:  Negative for back pain.   Skin:  Negative for rash, skin lesions and wound.   Neurological:  Negative for headache.   Psychiatric/Behavioral: Negative.         Past History:  Medical History: has a past medical history of Broken thumb, Diabetes, Hypertension, Left shoulder pain, and Palpitations.   Surgical History: has a past surgical history that includes Lipoma Excision (2016); Uterine fibroid surgery; ORIF finger / thumb fracture (2004); Shoulder arthroscopy (Left, 03/06/2018); and Foot surgery (Right).   Family History: family history includes Diabetes in her mother; Heart failure in her mother; Hypertension in her father and mother; Lupus in her father.   Social History: reports that she has never smoked. She has never been exposed to tobacco smoke. She has never used smokeless tobacco. She reports current alcohol use. She reports that she does not use drugs.    PHQ-2 Depression Screening  Little interest or pleasure in doing things?     Feeling down, depressed, or hopeless?     PHQ-2 Total Score         PHQ-9 " Depression Screening  Little interest or pleasure in doing things?     Feeling down, depressed, or hopeless?     PHQ-2 Total Score     Trouble falling or staying asleep, or sleeping too much?     Feeling tired or having little energy?     Poor appetite or overeating?     Feeling bad about yourself - or that you are a failure or have let yourself or your family down?     Trouble concentrating on things, such as reading the newspaper or watching television?     Moving or speaking so slowly that other people could have noticed? Or the opposite - being so fidgety or restless that you have been moving around a lot more than usual?     Thoughts that you would be better off dead, or of hurting yourself in some way?     PHQ-9 Total Score     If you checked off any problems, how difficult have these problems made it for you to do your work, take care of things at home, or get along with other people?          PHQ-9 Total Score:        Patient screened positive for depression based on a PHQ-9 score of  on . Follow-up recommendations include:          Current Outpatient Medications:     amLODIPine (NORVASC) 5 MG tablet, Take 1 tablet by mouth Daily., Disp: 90 tablet, Rfl: 3    fluticasone (FLONASE) 50 MCG/ACT nasal spray, Administer 2 sprays into the nostril(s) as directed by provider Daily., Disp: 16 g, Rfl: 2    hydroCHLOROthiazide 25 MG tablet, Take 1 tablet by mouth Daily., Disp: 90 tablet, Rfl: 3    meloxicam (Mobic) 7.5 MG tablet, Take 1 tablet by mouth Daily., Disp: 30 tablet, Rfl: 2    metFORMIN (GLUCOPHAGE) 500 MG tablet, Take 1 tablet by mouth 2 (Two) Times a Day With Meals., Disp: 60 tablet, Rfl: 3    promethazine-dextromethorphan (PROMETHAZINE-DM) 6.25-15 MG/5ML syrup, Take 5 mL by mouth 3 (Three) Times a Day As Needed for Cough., Disp: 120 mL, Rfl: 0    VITAMIN D PO, Take 3,000 Units by mouth., Disp: , Rfl:    (Not in a hospital admission)     Allergies: Amoxicillin-pot clavulanate    Physical  Exam  Constitutional:       Appearance: Normal appearance.   Eyes:      Conjunctiva/sclera: Conjunctivae normal.      Pupils: Pupils are equal, round, and reactive to light.   Cardiovascular:      Rate and Rhythm: Normal rate and regular rhythm.      Heart sounds: Normal heart sounds.   Pulmonary:      Effort: Pulmonary effort is normal.      Breath sounds: Normal breath sounds.   Neurological:      General: No focal deficit present.      Mental Status: She is alert and oriented to person, place, and time. Mental status is at baseline.   Psychiatric:         Mood and Affect: Mood normal.         Behavior: Behavior normal.         Thought Content: Thought content normal.         Judgment: Judgment normal.        Physical Exam  Lungs were auscultated.    Result Review :          Results  Laboratory Studies  Potassium was 3.4.             Assessment and Plan    Diagnoses and all orders for this visit:    1. Benign essential HTN (Primary)  -     Lipid Panel    2. Type 2 diabetes mellitus without complication, without long-term current use of insulin  -     Hemoglobin A1c    3. Screening for colon cancer    4. Encounter for screening mammogram for malignant neoplasm of breast  -     Mammo Screening Digital Tomosynthesis Bilateral With CAD; Future    5. Rheumatoid arthritis, involving unspecified site, unspecified whether rheumatoid factor present    6. Encounter for long-term (current) use of medications  -     Basic Metabolic Panel    7. Encounter for vitamin deficiency screening  -     Vitamin B12  -     Vitamin D,25-Hydroxy      Assessment & Plan  1. Diabetes mellitus.  Her diabetes is well-managed with metformin, and she remains asymptomatic. She maintains an active lifestyle and adheres to a healthy diet. A comprehensive set of laboratory tests will be conducted today, including liver function, kidney function, A1c, cholesterol, vitamin levels, and potassium.  Check feet daily.  Annual eye exams encouraged.  Proper  diet and exercise plan discussed and encouraged.  Risk of meds discussed and understood.    2. Hypertension.  Her blood pressure is well-controlled with amlodipine and hydrochlorothiazide. She will continue her current medication regimen.  Goal blood pressure less than 140/90.  Let me know if gets to or above that.    3. Early-stage rheumatoid arthritis.  She reports joint pain and stiffness, particularly in her hands, which has been confirmed as early-stage rheumatoid arthritis by her rheumatologist. She is advised to contact her rheumatologist to discuss starting treatment sooner rather than waiting until her scheduled appointment in June.    4. Health maintenance.  She has not yet completed her mammogram due to scheduling conflicts but has received her Cologuard test kit. She is advised to schedule her mammogram now that she is on the third shift.  Informed her to complete the Cologuard and contact me when she is completed for results.            BMI is within normal parameters. No other follow-up for BMI required.       Follow Up   Return in about 6 months (around 8/10/2025).  Patient was given instructions and counseling regarding her condition or for health maintenance advice. Please see specific information pulled into the AVS if appropriate.     Patient or patient representative verbalized consent for the use of Ambient Listening during the visit with  MARCIE Stacy for chart documentation. 2/10/2025  08:56 EST    MARCIE Stacy

## 2025-02-11 LAB
25(OH)D3+25(OH)D2 SERPL-MCNC: 88.7 NG/ML (ref 30–100)
BUN SERPL-MCNC: 13 MG/DL (ref 6–24)
BUN/CREAT SERPL: 20 (ref 9–23)
CALCIUM SERPL-MCNC: 9.5 MG/DL (ref 8.7–10.2)
CHLORIDE SERPL-SCNC: 99 MMOL/L (ref 96–106)
CHOLEST SERPL-MCNC: 168 MG/DL (ref 100–199)
CO2 SERPL-SCNC: 25 MMOL/L (ref 20–29)
CREAT SERPL-MCNC: 0.64 MG/DL (ref 0.57–1)
EGFRCR SERPLBLD CKD-EPI 2021: 104 ML/MIN/1.73
GLUCOSE SERPL-MCNC: 143 MG/DL (ref 70–99)
HBA1C MFR BLD: 7.5 % (ref 4.8–5.6)
HDLC SERPL-MCNC: 75 MG/DL
LDLC SERPL CALC-MCNC: 77 MG/DL (ref 0–99)
POTASSIUM SERPL-SCNC: 3.6 MMOL/L (ref 3.5–5.2)
SODIUM SERPL-SCNC: 139 MMOL/L (ref 134–144)
TRIGL SERPL-MCNC: 86 MG/DL (ref 0–149)
VIT B12 SERPL-MCNC: >2000 PG/ML (ref 232–1245)
VLDLC SERPL CALC-MCNC: 16 MG/DL (ref 5–40)

## 2025-02-13 ENCOUNTER — TELEPHONE (OUTPATIENT)
Dept: FAMILY MEDICINE CLINIC | Facility: CLINIC | Age: 55
End: 2025-02-13
Payer: MEDICAID

## 2025-02-13 NOTE — TELEPHONE ENCOUNTER
Please let patient know I have called in an increased dose of metformin.  She is currently taking 500 mg twice a day.  I have called her in a 1000 mg tablet.  For the next 2 weeks I want her to take 1 tablet in the morning and half a tablet in the evening.  After 2 weeks she can increase it to 1 tablet twice a day.  Thanks

## 2025-02-13 NOTE — TELEPHONE ENCOUNTER
Name: Kimberly Sherrie M    Relationship: Self    Best Callback Number: [phone]  HUB PROVIDED THE RELAY MESSAGE FROM THE OFFICE   PATIENT VOICED UNDERSTANDING AND HAS NO FURTHER QUESTIONS AT THIS TIME    ADDITIONAL INFORMATION: PT STATED THAT SHE IS FINE WITH INCREASE IN METFORMIN

## 2025-02-13 NOTE — TELEPHONE ENCOUNTER
JUAN on  to call back.    HUB to relay.      ----- Message from Johnny Sanchez sent at 2/12/2025  5:07 PM EST -----  Please let patient know from labs glucose is high at 143.  Her A1c has improved but still high.  It is at 7.5.  Ideally I would like to get her 7 or less.  At this point I would recommend that we increase her metformin.  Is she fine with this?  Let me know.  Continue with healthy diabetic diet and exercise.    Vitamin B is high so she can cut back on her vitamins that she is taking that contain vitamin B.

## 2025-02-13 NOTE — TELEPHONE ENCOUNTER
Spoke with patient. Relayed provider message. Pt voiced understanding.       Pt would like to know when you would like her to follow up after she begins new dosage.

## 2025-02-17 ENCOUNTER — OFFICE VISIT (OUTPATIENT)
Age: 55
End: 2025-02-17
Payer: MEDICAID

## 2025-02-17 VITALS
DIASTOLIC BLOOD PRESSURE: 78 MMHG | WEIGHT: 157.8 LBS | SYSTOLIC BLOOD PRESSURE: 126 MMHG | HEIGHT: 68 IN | BODY MASS INDEX: 23.92 KG/M2

## 2025-02-17 DIAGNOSIS — R76.8 ANA POSITIVE: ICD-10-CM

## 2025-02-17 DIAGNOSIS — M25.50 ARTHRALGIA OF MULTIPLE SITES: ICD-10-CM

## 2025-02-17 DIAGNOSIS — R76.8 CYCLIC CITRULLINATED PEPTIDE (CCP) ANTIBODY POSITIVE: Primary | ICD-10-CM

## 2025-02-17 NOTE — PROGRESS NOTES
[facility] [place] Up       Office Follow Up Visit       Patient Name: Sherrie Harris    Chief Complaint:   Chief Complaint   Patient presents with    Follow-up     2.5 week f/u-- Arthritis of carpometacarpal (CMC) joint of left thumb, Sprain of proximal interphalangeal (PIP) joint of finger       Referring Physician: No ref. provider found    History of Present Illness:   Sherrie Harris returns to clinic today for follow-up right small finger.  She has been doing physical therapy as prescribed.  She reports improved motion.  No pain.  No new symptoms.      Subjective     Review of Systems   Constitutional:  Negative for chills, fever, unexpected weight gain and unexpected weight loss.   HENT:  Negative for congestion, postnasal drip and rhinorrhea.    Eyes:  Negative for blurred vision.   Respiratory:  Negative for shortness of breath.    Cardiovascular:  Negative for leg swelling.   Gastrointestinal:  Negative for abdominal pain, nausea and vomiting.   Genitourinary:  Negative for difficulty urinating.   Musculoskeletal:  Positive for arthralgias. Negative for gait problem, joint swelling and myalgias.   Skin:  Negative for skin lesions and wound.   Neurological:  Negative for dizziness, weakness, light-headedness and numbness.   Hematological:  Does not bruise/bleed easily.   Psychiatric/Behavioral:  Negative for depressed mood.         I have reviewed and updated the following portions of the patient's history and review of systems: allergies, current medications, past family history, past medical history, past social history, past surgical history and problem list.    Medications:   Current Outpatient Medications:     amLODIPine (NORVASC) 5 MG tablet, Take 1 tablet by mouth Daily., Disp: 90 tablet, Rfl: 3    fluticasone (FLONASE) 50 MCG/ACT nasal spray, Administer 2 sprays into the nostril(s) as directed by provider Daily., Disp: 16 g, Rfl:  "2    hydroCHLOROthiazide 25 MG tablet, Take 1 tablet by mouth Daily., Disp: 90 tablet, Rfl: 3    meloxicam (Mobic) 7.5 MG tablet, Take 1 tablet by mouth Daily., Disp: 30 tablet, Rfl: 2    metFORMIN (GLUCOPHAGE) 1000 MG tablet, Take 1 tablet by mouth 2 (Two) Times a Day With Meals., Disp: 180 tablet, Rfl: 1    promethazine-dextromethorphan (PROMETHAZINE-DM) 6.25-15 MG/5ML syrup, Take 5 mL by mouth 3 (Three) Times a Day As Needed for Cough., Disp: 120 mL, Rfl: 0    VITAMIN D PO, Take 3,000 Units by mouth., Disp: , Rfl:     Allergies:   Allergies   Allergen Reactions    Amoxicillin-Pot Clavulanate Hives         Objective      Vital Signs:   Vitals:    02/17/25 1516   BP: 126/78   Weight: 71.6 kg (157 lb 12.8 oz)   Height: 171.5 cm (67.52\")       Ortho Exam:  Right hand exam: Full range of motion of all digits.  Able to make a composite fist with good strength.  Digits well-perfused.  Pulses 2+.    Results Review:  XR Hand 2 View Bilateral  Narrative: XR HAND 2 VW BILATERAL    Date of Exam: 1/28/2025 3:22 PM EST    Indication: pain    Comparison: Right hand x-rays 11/21/2024    Findings:  No soft tissue swelling. Normal bony mineralization. The metacarpophalangeal joint spaces are preserved. There are mild scattered interphalangeal osteoarthritic changes. No acute fracture or malalignment. No discrete erosion or periostitis. Suture anchor   is noted in the left thumb metacarpal head.  Impression: Impression:  No acute osseous findings or significant/advanced degenerative change.    Electronically Signed: Carlos Mack MD    1/30/2025 10:18 PM EST    Workstation ID: [id number]  XR Shoulder 2+ View Left  Narrative: XR SHOULDER 2+ VW LEFT    Date of Exam: 1/28/2025 3:22 PM EST    Indication: pain    Comparison: None available.    Findings:  No soft tissue swelling. No acute fracture or malalignment. Unremarkable appearance of the glenohumeral and acromioclavicular joints. No high-grade narrowing of the subacromial space. " Unremarkable appearance of the partially imaged thorax.  Impression: Impression:  Normal shoulder.    Electronically Signed: Carlos Mack MD    1/30/2025 6:00 PM EST    Workstation ID: [id number]       XR Hand 2 View Bilateral    Result Date: 1/30/2025  Impression: No acute osseous findings or significant/advanced degenerative change. Electronically Signed: Carlos Mack MD  1/30/2025 10:18 PM EST  Workstation ID: [id number]    XR Shoulder 2+ View Left    Result Date: 1/30/2025  Impression: Normal shoulder. Electronically Signed: Carlos Mack MD  1/30/2025 6:00 PM EST  Workstation ID: [id number]    XR Finger 2+ View Right    Result Date: 11/22/2024  Impression: Mild scattered interphalangeal osteoarthritic changes. No acute fracture or malalignment of the hand or fifth finger. Electronically Signed: Carlos Mack MD  11/22/2024 5:37 PM EST  Workstation ID: [id number]    XR Hand 3+ View Right    Result Date: 11/22/2024  Impression: Mild scattered interphalangeal osteoarthritic changes. No acute fracture or malalignment of the hand or fifth finger. Electronically Signed: Carlos Mack MD  11/22/2024 5:37 PM EST  Workstation ID: [id number]        Assessment / Plan      Assessment:   Diagnoses and all orders for this visit:    1. Cyclic citrullinated peptide (CCP) antibody positive (Primary)  -     Ambulatory Referral to Rheumatology    2. MISTY positive  -     Ambulatory Referral to Rheumatology    3. Arthralgia of multiple sites  -     Ambulatory Referral to Rheumatology        Quality Metrics:   BMI:   BMI is within normal parameters. No other follow-up for BMI required.       Tobacco:   Sherrie Harris  reports that she has never smoked. She has never been exposed to tobacco smoke. She has never used smokeless tobacco.      Plan:  Right small finger sprain.  Reviewed clinical findings with the patient.  She has been doing physical therapy with improved motion.  She now has full extension.  She is not having any  pain.  She will finish up PT and return to see me as needed.  Patient has seen rheumatology for positive MISTY and CCP labs.  She would like referral to another rheumatologist.  I will place this.        Hafsa aSwyer PA-C  [facility]    Dictated using Dragon Speech Recognition.

## 2025-02-18 ENCOUNTER — OFFICE VISIT (OUTPATIENT)
Age: 55
End: 2025-02-18
Payer: MEDICAID

## 2025-02-18 VITALS
DIASTOLIC BLOOD PRESSURE: 80 MMHG | HEIGHT: 68 IN | TEMPERATURE: 97.3 F | SYSTOLIC BLOOD PRESSURE: 140 MMHG | WEIGHT: 154.7 LBS | BODY MASS INDEX: 23.45 KG/M2

## 2025-02-18 DIAGNOSIS — R76.8 ANA POSITIVE: Primary | ICD-10-CM

## 2025-02-18 PROCEDURE — 3077F SYST BP >= 140 MM HG: CPT | Performed by: INTERNAL MEDICINE

## 2025-02-18 PROCEDURE — 3079F DIAST BP 80-89 MM HG: CPT | Performed by: INTERNAL MEDICINE

## 2025-02-18 PROCEDURE — 1159F MED LIST DOCD IN RCRD: CPT | Performed by: INTERNAL MEDICINE

## 2025-02-18 PROCEDURE — 1160F RVW MEDS BY RX/DR IN RCRD: CPT | Performed by: INTERNAL MEDICINE

## 2025-02-18 PROCEDURE — 99214 OFFICE O/P EST MOD 30 MIN: CPT | Performed by: INTERNAL MEDICINE

## 2025-02-18 RX ORDER — HYDROXYCHLOROQUINE SULFATE 200 MG/1
200 TABLET, FILM COATED ORAL DAILY
Qty: 30 TABLET | Refills: 5 | Status: SHIPPED | OUTPATIENT
Start: 2025-02-18

## 2025-02-18 NOTE — PROGRESS NOTES
Office Follow Up      Date: 02/18/2025   Patient Name: Sherrie Harris  MRN: [id number]  YOB: 1970    Referring Physician: Johnny Sanchez AP*     Chief Complaint: Finger locking and joint pain right hand  Chief Complaint   Patient presents with    Joint Pain       History of Present Illness: Sherrie Harris is a 54 y.o. female with history of diabetes, hypertension, left shoulder repair 3/6/2018, adult onset neutropenia here for follow-up above hand pain and stiffness with with positive MISTY/rheumatoid factor/CCP antibody/C ANCA     She reportedly has had mild neutropenia dating back to around 2022.  She has been asymptomatic from this.  No recurrent infections.  Extensive workup of neutropenia with Dr. Escobar has been unrevealing beyond a positive MISTY.  In light of positive MISTY on lab tests, Dr. Escobar is concerned about the possibility of autoimmune disease causing her neutropenia.  Her father reportedly had lupus.     She denies malar rash, Raynaud's, oral nasal ulcers, pleurisy/pericarditis, renal/hematologic abnormalities beyond neutropenia, seizures, iritis, psoriasis.  No hot swollen joints.  No recurrent blood clots.  No photosensitivity    She has known osteoarthritis of the hands     Specialist: Orthopedics Dr. Gallardo, hematology Dr. Escobar     History of Present Illness  The patient is a 54-year-old female who presents for hand pain and finger locking with abnormal labs, positive MISTY, positive rheumatoid factor, and positive CCP antibody.    She reports experiencing episodes of her left second finger locking, which she has documented with a photograph. This phenomenon typically occurs mid-morning during her work shift, where she frequently uses her hands. She also notes swelling in the joints of this finger, which had previously bent back under her, although she retains mobility. The affected finger remains sore and stiff upon waking. She occasionally  experiences discomfort in her right wrist at night and persistent shoulder pain. Her other hand does not exhibit similar symptoms.  She expresses concern about the potential impact of her continuous hand usage on her condition. . She is currently undergoing hand therapy for her little finger and plans to discuss additional exercises with her therapist. She is currently on meloxicam 7.5 mg twice daily for pain management but is uncertain of its efficacy.        Subjective       Review of Systems: Review of Systems   Constitutional:  Negative for chills, fatigue, fever and unexpected weight loss.   HENT:  Negative for mouth sores, sinus pressure and sore throat.    Eyes:  Negative for pain and redness.   Respiratory:  Negative for cough and shortness of breath.    Cardiovascular:  Negative for chest pain.   Gastrointestinal:  Negative for abdominal pain, blood in stool, diarrhea, nausea, vomiting and GERD.   Endocrine: Negative for polydipsia and polyuria.   Genitourinary:  Negative for dysuria, genital sores and hematuria.   Musculoskeletal:  Positive for arthralgias. Negative for back pain, joint swelling, myalgias, neck pain and neck stiffness.   Skin:  Negative for rash and bruise.   Neurological:  Negative for seizures, weakness, numbness and memory problem.   Hematological:  Negative for adenopathy. Does not bruise/bleed easily.   Psychiatric/Behavioral:  Negative for depressed mood. The patient is not nervous/anxious.         Past Medical History:   Past Medical History:   Diagnosis Date    Broken thumb     LEFT THUMB    Diabetes     Hypertension     Left shoulder pain     Palpitations        Past Surgical History:   Past Surgical History:   Procedure Laterality Date    FOOT SURGERY Right     LIPOMA EXCISION  2016    LEFT SHOULDER    ORIF FINGER / THUMB FRACTURE  2004    LEFT THUMB     SHOULDER ARTHROSCOPY Left 03/06/2018    Procedure: LEFT SHOULDER ARTHROSCOPY,  SUBACROMIAL DECOMPRESSION,  DISTAL CLAVICLE  "EXCISION,  BICEPS TENOTOMY, and LABRAL DEBRIDEMENT;  Surgeon: TITUS Huntley MD;  Location[place] [facility] [place];  Service:     UTERINE FIBROID SURGERY         Family History:   Family History   Problem Relation Age of Onset    Diabetes Mother     Hypertension Mother     Heart failure Mother     Hypertension Father     Lupus Father     Malig Hyperthermia Neg Hx        Social History:   Social History     Socioeconomic History    Marital status: Single   Tobacco Use    Smoking status: Never     Passive exposure: Never    Smokeless tobacco: Never   Vaping Use    Vaping status: Never Used   Substance and Sexual Activity    Alcohol use: Yes     Comment: OCCASIONAL    Drug use: No    Sexual activity: Defer       Medications:   Current Outpatient Medications:     amLODIPine (NORVASC) 5 MG tablet, Take 1 tablet by mouth Daily., Disp: 90 tablet, Rfl: 3    fluticasone (FLONASE) 50 MCG/ACT nasal spray, Administer 2 sprays into the nostril(s) as directed by provider Daily., Disp: 16 g, Rfl: 2    hydroCHLOROthiazide 25 MG tablet, Take 1 tablet by mouth Daily., Disp: 90 tablet, Rfl: 3    meloxicam (Mobic) 7.5 MG tablet, Take 1 tablet by mouth Daily., Disp: 30 tablet, Rfl: 2    metFORMIN (GLUCOPHAGE) 1000 MG tablet, Take 1 tablet by mouth 2 (Two) Times a Day With Meals., Disp: 180 tablet, Rfl: 1    promethazine-dextromethorphan (PROMETHAZINE-DM) 6.25-15 MG/5ML syrup, Take 5 mL by mouth 3 (Three) Times a Day As Needed for Cough., Disp: 120 mL, Rfl: 0    VITAMIN D PO, Take 3,000 Units by mouth., Disp: , Rfl:     hydroxychloroquine (Plaquenil) 200 MG tablet, Take 1 tablet by mouth Daily., Disp: 30 tablet, Rfl: 5    Allergies:   Allergies   Allergen Reactions    Amoxicillin-Pot Clavulanate Hives           Objective        Vital Signs:   Vitals:    02/18/25 0958   BP: 140/80   BP Location: Left arm   Patient Position: Sitting   Cuff Size: Adult   Temp: 97.3 °F (36.3 °C)   Weight: 70.2 kg (154 lb 11.2 oz)   Height: 171.5 cm (67.52\")   PainSc: " "7      Body mass index is 23.86 kg/m².      Physical Exam:  Physical Exam   MUSCULOSKELETAL:   No peripheral synovitis.  No dactylitis.  No pitting the nails.  No ulnar deviation.  No rheumatoid tophi  Tender right second third MCP joints without swelling.    Complete joint exam was performed including the MCPs, PIPs, DIPs of the hands, wrists, elbows, shoulders, hips, knees and ankles.  No soft tissue swelling or tenderness is present except as above.    General: The patient is well-developed and well nourished. Cooperative, alert and oriented. Affect is normal. Hydration appears normal.   HEENT: Normocephalic and atraumatic. Lids and conjunctiva are normal. Pupils are equal and sclera are clear. Oropharynx is clear   NECK neck is supple without adenopathy, masses or thyromegaly.   CARDIOVASCULAR: Regular rate and rhythm. No murmurs, rubs or gallops   LUNGS: Effort is normal. Lungs are clear bilateral   ABDOMEN: Not examined  EXTREMITIES: Peripheral pulses are intact. No clubbing.   SKIN: No rashes. No subcutaneous nodules. No digital ulcers. No sclerodactyly.   NEUROLOGIC: Gait is normal. Strength testing is normal.  No focal neurologic deficits    Results Review:   Labs:   Lab Results   Component Value Date    GLUCOSE 143 (H) 02/10/2025    BUN 13 02/10/2025    CREATININE 0.64 02/10/2025    EGFR 104 02/10/2025    BCR 20 02/10/2025    K 3.6 02/10/2025    CO2 25 02/10/2025    CALCIUM 9.5 02/10/2025    ALBUMIN 4.8 01/28/2025    BILITOT 0.5 01/28/2025    AST 22 01/28/2025    ALT 15 01/28/2025     Lab Results   Component Value Date    WBC 3.58 01/28/2025    HGB 13.3 01/28/2025    HCT 39.4 01/28/2025    MCV 87.0 01/28/2025     01/28/2025     Lab Results   Component Value Date    SEDRATE 22 01/28/2025     Lab Results   Component Value Date    CRP <0.30 01/28/2025     No results found for: \"QUANTIFERO\", \"QUANTITB1\", \"QUANTITB2\", \"QUANTIFERN\", \"QUANTIFERM\", \"QUANTITBGLDP\"  No results found for: \"RF\"  Lab Results "   Component Value Date    HEPBSAG Non-Reactive 08/23/2024    HEPBSAG Negative 08/23/2024    HEPAIGM Non-Reactive 08/23/2024    HEPBIGMCORE Non-Reactive 08/23/2024    HEPCVIRUSABY Non-Reactive 08/23/2024         Procedures    Assessment / Plan      -Positive MISTY, rheumatoid factor 17, CCP antibody 28, c-ANCA positive  -Hand pain and finger locking  -History of neutropenia  -Generalized osteoarthritis -Followed by [facility] Dr. Gallardo    -Initially sent in consultation from Dr. Escobar 1/28/2025 for positive MISTY  -Mild chronic asymptomatic neutropenia dating back to 2022(discharged from Dr. Escobar)  -Labs 8/23/2024: White blood cell 3010 with ANC 1430 lower limit of normal 1700, otherwise unremarkable CBC.  Peripheral smear showed no immature forms and no other abnormalities of Red cell platelets   +MISTY 1:160 titer homogenous pattern, negative CCP antibody, negative HIV, hepatitis panel, EBV  Serum copper normal 104, B12 greater than 2000, folic acid normal 19  -Labs 1/28/2025: Normal CBC, normal sed rate, low C4 10, normal C3, positive MISTY 1: 640, negative NASEEM panel,   positive rheumatoid factor 17, positive CCP antibody 28, positive c-ANCA 1:160, otherwise negative ANCA panel  -Bilateral hand x-rays 1/28/2025: No erosive changes.  No acute osseous findings or significant advanced degenerative change       She reportedly has had mild neutropenia dating back to around 2022.  She has been asymptomatic from this.  No recurrent infections.  Extensive workup of mild neutropenia with Dr. Escobar has been unrevealing beyond a weakly positive MISTY.  In light of positive MISTY on lab tests, Dr. Escobar is concerned about the possibility of autoimmune disease causing her neutropenia   Her father reportedly had lupus diagnosed late in life.  -Labs in rheumatology clinic show positive rheumatoid factor/CCP antibodies/C ANCA/MISTY  -Increased pain and stiffness particularly right hand second third MCP joints with occasional ulnar  drift of the left second digit under the other digits.       Discussion    Her rheumatoid factor and CCP antibody are weakly positive, suggesting a potential diagnosis of early rheumatoid arthritis. However, the absence of swelling typically associated with this condition raises uncertainty. It is plausible that she may be in the early stages of the disease. Given her history of low white blood cell count, which has since normalized, the use of potent immunosuppressive drugs is not recommended. Her blood counts are now within the normal range, and specific markers for lupus are negative. Although she has a positive MISTY, this is a nonspecific finding present in 20 percent of rheumatoid patients and 5 to 15% of the general population. A positive C-ANCA, associated with ANCA vasculitis, is noted, but specific markers for ANCA vasculitis are negative. X-rays of her hand reveal some osteoarthritic changes but no erosions.     -Labs and clinical picture are suggestive of possible early RA with positive rheumatoid factor/positive CCP antibody  -However she has no definitive synovitis on exam, so does not meet full criteria for RA  I suspect she may have preclinical RA with positive RF/CCP antibody with stiffness and pain, extended morning stiffness particularly MCP joints 2 and 3 right hand with occasional ulnar drift of the right second digit  -She does have some osteoarthritis hands causing arthralgias     There are no clinical features of psoriatic arthritis, scleroderma, vasculitis, lupus or connective tissue disease.  No Raynaud's, no malar rash, no oral/nasal ulcers, no pleurisy/pericarditis, no seizure disorder, no renal or hematologic abnormality.  No clotting disorder.  No photosensitivity.  No constitutional symptoms       Recommendations     -Chest x-ray to rule out ANCA associated vasculitis or sarcoidosis in light of positive c-ANCA, with otherwise negative ANCA panel  -Continue as needed meloxicam 7.5 mg  twice daily for OA pain  -Continue hand therapy  -We discussed addition of hydroxychloroquine 200 mg once daily for suspected preclinical RA and she wishes to proceed with this  Handout provided on hydroxychloroquine and RA  We discussed possible side effects of hydroxychloroquine including headache, nausea, diarrhea, rare retinal pigmentation, rare neuromuscular toxicity.  Recommend eye exams every 6 to 12 months to monitor for retinal toxicity.  Her  is present through the exam and discussion today    -High risk medication  Hydroxychloroquine    Orders Placed This Encounter   Procedures    XR Chest 2 View     New Medications Ordered This Visit   Medications    hydroxychloroquine (Plaquenil) 200 MG tablet     Sig: Take 1 tablet by mouth Daily.     Dispense:  30 tablet     Refill:  5       Follow Up:   Return in about 4 months (around 6/9/2025).      Portions of this note have been copied forward.  I have reviewed and updated the patient's chief complaint, history of present illness, review of systems, past medical history, surgical history, family history, social history, medications and allergy list, physical exam, assessment and plan as appropriate.     Patient or patient representative verbalized consent for the use of Ambient Listening during the visit with  Brian Sanchez MD for chart documentation. 2/18/2025  10:37 EST      Brian Sanchez MD  [facility]

## 2025-02-18 NOTE — PATIENT INSTRUCTIONS
Rheumatoid Arthritis    Rheumatoid arthritis (RA) is a long-term (chronic) disease that causes inflammation in the joints. RA may start slowly. It most often affects the small joints of the hands and feet. Usually, the same joints are affected on both sides of the body. Inflammation from RA can also affect other parts of the body, including the heart, eyes, or lungs.  There is no cure for RA, but medicines can help your symptoms and stop or slow down the progression of the disease.    What are the causes?  RA is an autoimmune disease. When you have an autoimmune disease, your body's defense system (immune system) mistakenly attacks healthy body tissues. The exact cause of RA is not known.    What increases the risk?  The following factors may make you more likely to develop this condition:  Being female.  Having a family history of RA or other autoimmune diseases.  Having a history of smoking.  Being obese.  Having been exposed to pollutants or chemicals.    What are the signs or symptoms?  Symptoms of this condition usually start gradually. They are often worse in the morning. The first symptom may be morning stiffness that lasts longer than 30 minutes.    As RA progresses, symptoms may include:  Pain, stiffness, swelling, warmth, and tenderness in joints on both sides of your body.  Loss of energy.  Loss of appetite.  Weight loss.  Low-grade fever.  Dry eyes and dry mouth.  Firm lumps (rheumatoid nodules) that grow beneath your skin in areas such as your forearm bones near your elbows and on your hands.  Changes in the appearance of joints (deformity) and loss of joint function.    Symptoms of this condition vary from person to person.  Symptoms of RA often come and go.  Sometimes, symptoms get worse for a period of time. These are called flares.    How is this diagnosed?  This condition is diagnosed based on your symptoms, medical history, and a physical exam. You may have X-rays or an MRI to check for the type  "of joint changes that are caused by RA.  You may also have blood tests to look for:  Proteins (antibodies) that your immune system may make if you have RA. These include rheumatoid factor (RF) and anti-CCP.  When blood tests show these proteins, you are said to have \"seropositive RA.\"  When blood tests do not show these proteins, you may have \"seronegative RA.\"  Inflammation in your blood.  A low number of red blood cells (anemia).    How is this treated?    The goals of treatment are to relieve pain, reduce inflammation, and slow down or stop joint damage and disability. Treatment may include:  Lifestyle changes. It is important to rest as needed, eat a healthy diet, and exercise.  Medicines. Your health care provider may adjust your medicines every 3 months until treatment goals are reached. Common medicines include:  Pain relievers (analgesics).  Corticosteroids and NSAIDs, such as ibuprofen, to reduce inflammation.  Disease-modifying antirheumatic drugs (DMARDs) to try to slow the course of the disease.  Biologic response modifiers to reduce inflammation and damage.  Physical therapy and occupational therapy.  Surgery, if you have severe joint damage. Joint replacement or fusing of joints may be needed.  Your health care provider will work with you to identify the best treatment option for you based on assessment of the overall disease activity in your body.    Follow these instructions at home:    Managing pain, stiffness, and swelling    If directed, apply heat to the affected area as often as told by your health care provider. Use the heat source that your health care provider recommends, such as a moist heat pack or a heating pad.  Place a towel between your skin and the heat source.  Leave the heat on for 20-30 minutes.  Remove the heat if your skin turns bright red. This is especially important if you are unable to feel pain, heat, or cold. You have a greater risk of getting burned.    Activity  Return to " your normal activities as told by your health care provider. Ask your health care provider what activities are safe for you.  Rest when you are having a flare.  Start an exercise program as told by your health care provider. This may include physical therapy exercises to maintain movement and strength in your joints.    General instructions  Take over-the-counter and prescription medicines only as told by your health care provider.  Keep all follow-up visits. This is important.    Where to find more information  American College of Rheumatology: rheumatology.org  Arthritis Foundation: arthritis.org    Contact a health care provider if:  You have a flare-up of RA symptoms.  You have a fever.  You have side effects from your medicines.    Get help right away if:  You have chest pain.  You have trouble breathing.  You quickly develop a hot, painful joint that is more severe than your usual joint aches.  These symptoms may be an emergency. Get help right away. Call 911.  Do not wait to see if the symptoms will go away.  Do not drive yourself to the hospital.    Summary  Rheumatoid arthritis (RA) is a long-term (chronic) disease that causes inflammation in the joints.  RA is an autoimmune disease.  The goals of treatment are to relieve pain, reduce inflammation, and slow down or stop joint damage and disability.    This information is not intended to replace advice given to you by your health care provider. Make sure you discuss any questions you have with your health care provider.  Document Revised: 10/20/2022 Document Reviewed: 10/20/2022  [employer] Patient Education © 2024 [employer] Rheumatoid Arthritis    Rheumatoid arthritis (RA) is a long-term (chronic) disease that causes inflammation in the joints. RA may start slowly. It most often affects the small joints of the hands and feet. Usually, the same joints are affected on both sides of the body. Inflammation from RA can also affect other parts of the body,  "including the heart, eyes, or lungs.  There is no cure for RA, but medicines can help your symptoms and stop or slow down the progression of the disease.    What are the causes?  RA is an autoimmune disease. When you have an autoimmune disease, your body's defense system (immune system) mistakenly attacks healthy body tissues. The exact cause of RA is not known.    What increases the risk?  The following factors may make you more likely to develop this condition:  Being female.  Having a family history of RA or other autoimmune diseases.  Having a history of smoking.  Being obese.  Having been exposed to pollutants or chemicals.    What are the signs or symptoms?  Symptoms of this condition usually start gradually. They are often worse in the morning. The first symptom may be morning stiffness that lasts longer than 30 minutes.    As RA progresses, symptoms may include:  Pain, stiffness, swelling, warmth, and tenderness in joints on both sides of your body.  Loss of energy.  Loss of appetite.  Weight loss.  Low-grade fever.  Dry eyes and dry mouth.  Firm lumps (rheumatoid nodules) that grow beneath your skin in areas such as your forearm bones near your elbows and on your hands.  Changes in the appearance of joints (deformity) and loss of joint function.    Symptoms of this condition vary from person to person.  Symptoms of RA often come and go.  Sometimes, symptoms get worse for a period of time. These are called flares.    How is this diagnosed?  This condition is diagnosed based on your symptoms, medical history, and a physical exam. You may have X-rays or an MRI to check for the type of joint changes that are caused by RA.  You may also have blood tests to look for:  Proteins (antibodies) that your immune system may make if you have RA. These include rheumatoid factor (RF) and anti-CCP.  When blood tests show these proteins, you are said to have \"seropositive RA.\"  When blood tests do not show these proteins, " "you may have \"seronegative RA.\"  Inflammation in your blood.  A low number of red blood cells (anemia).    How is this treated?    The goals of treatment are to relieve pain, reduce inflammation, and slow down or stop joint damage and disability. Treatment may include:  Lifestyle changes. It is important to rest as needed, eat a healthy diet, and exercise.  Medicines. Your health care provider may adjust your medicines every 3 months until treatment goals are reached. Common medicines include:  Pain relievers (analgesics).  Corticosteroids and NSAIDs, such as ibuprofen, to reduce inflammation.  Disease-modifying antirheumatic drugs (DMARDs) to try to slow the course of the disease.  Biologic response modifiers to reduce inflammation and damage.  Physical therapy and occupational therapy.  Surgery, if you have severe joint damage. Joint replacement or fusing of joints may be needed.  Your health care provider will work with you to identify the best treatment option for you based on assessment of the overall disease activity in your body.    Follow these instructions at home:    Managing pain, stiffness, and swelling    If directed, apply heat to the affected area as often as told by your health care provider. Use the heat source that your health care provider recommends, such as a moist heat pack or a heating pad.  Place a towel between your skin and the heat source.  Leave the heat on for 20-30 minutes.  Remove the heat if your skin turns bright red. This is especially important if you are unable to feel pain, heat, or cold. You have a greater risk of getting burned.    Activity  Return to your normal activities as told by your health care provider. Ask your health care provider what activities are safe for you.  Rest when you are having a flare.  Start an exercise program as told by your health care provider. This may include physical therapy exercises to maintain movement and strength in your joints.    General " instructions  Take over-the-counter and prescription medicines only as told by your health care provider.  Keep all follow-up visits. This is important.    Where to find more information  American College of Rheumatology: rheumatology.org  Arthritis Foundation: arthritis.org    Contact a health care provider if:  You have a flare-up of RA symptoms.  You have a fever.  You have side effects from your medicines.    Get help right away if:  You have chest pain.  You have trouble breathing.  You quickly develop a hot, painful joint that is more severe than your usual joint aches.  These symptoms may be an emergency. Get help right away. Call 911.  Do not wait to see if the symptoms will go away.  Do not drive yourself to the hospital.    Summary  Rheumatoid arthritis (RA) is a long-term (chronic) disease that causes inflammation in the joints.  RA is an autoimmune disease.  The goals of treatment are to relieve pain, reduce inflammation, and slow down or stop joint damage and disability.    This information is not intended to replace advice given to you by your health care provider. Make sure you discuss any questions you have with your health care provider.  Document Revised: 10/20/2022 Document Reviewed: 10/20/2022  [employer] Patient Education © 2024 [employer]

## 2025-04-11 ENCOUNTER — TELEPHONE (OUTPATIENT)
Dept: FAMILY MEDICINE CLINIC | Facility: CLINIC | Age: 55
End: 2025-04-11
Payer: MEDICAID

## 2025-04-11 RX ORDER — DAPAGLIFLOZIN 5 MG/1
5 TABLET, FILM COATED ORAL DAILY
Qty: 30 TABLET | Refills: 2 | Status: SHIPPED | OUTPATIENT
Start: 2025-04-11

## 2025-04-11 NOTE — TELEPHONE ENCOUNTER
She can stop metformin and I have called in a new medication called Farxiga for her to take instead.  Really stress healthy diabetic diet and exercise.  Keep her follow-up appointment as scheduled with me on 5/14/2025.  Thanks

## 2025-04-11 NOTE — TELEPHONE ENCOUNTER
Patient states she is wanting to stop her metformin due to using the bathroom multiple times. She is requesting an alternative.       [phone]

## 2025-04-14 ENCOUNTER — TELEPHONE (OUTPATIENT)
Dept: FAMILY MEDICINE CLINIC | Facility: CLINIC | Age: 55
End: 2025-04-14
Payer: COMMERCIAL

## 2025-04-14 NOTE — TELEPHONE ENCOUNTER
Caller: Sherrie Harris    Relationship to patient: Self      Best call back number: [phone]     Provider: CHERISE FAULKNER    Medication PA needed: FARXIGA

## 2025-04-21 ENCOUNTER — TELEPHONE (OUTPATIENT)
Dept: FAMILY MEDICINE CLINIC | Facility: CLINIC | Age: 55
End: 2025-04-21
Payer: MEDICAID

## 2025-04-21 NOTE — TELEPHONE ENCOUNTER
PA for Farxiga that was imitated on 04/14/25 never got a response.     Submitted a NEW PA for Farxiga on 04/21/25     Key: [id number]

## 2025-04-21 NOTE — TELEPHONE ENCOUNTER
Elizabeth, please call patient and check in. If it is for the farxiga please make sure it is getting approved with a PA. She can be given samples of farxiga if we have them. Please clarify. Thanks

## 2025-04-21 NOTE — TELEPHONE ENCOUNTER
Caller: Sherrie Harris    Relationship: Self    Best call back number:[phone]       What was the call regarding: WANTS TO SPEAK DIRECTLY TO CHERISE CALLAHAN. SAYS THERE HAS BEEN A MISCOMMUNICATION AND SHE'S BEEN WITH OUT MEDICATION FOR 5 DAYS NOW. WOULD NOT GO INTO DETAIL WITH HUB. PLEASE CALL ASAP

## 2025-04-21 NOTE — TELEPHONE ENCOUNTER
Called patient and she states she does not want to speak any clinical staff. I informed her that he was in clinic seeing patients and offered to help and she said no. She wants to speak directly to Johnny.

## 2025-04-30 ENCOUNTER — TELEPHONE (OUTPATIENT)
Dept: FAMILY MEDICINE CLINIC | Facility: CLINIC | Age: 55
End: 2025-04-30
Payer: COMMERCIAL

## 2025-04-30 DIAGNOSIS — E11.9 TYPE 2 DIABETES MELLITUS WITHOUT COMPLICATION, WITHOUT LONG-TERM CURRENT USE OF INSULIN: Primary | ICD-10-CM

## 2025-04-30 NOTE — TELEPHONE ENCOUNTER
Caller: Sherrie Harris    Relationship: Self    Best call back number: [phone]     What is the medical concern/diagnosis: DIABETES    What specialty or service is being requested: ENDOCRINOLOGY    What is the provider, practice or medical service name: [facility]    What is the office location: [place]WORK    What is the office phone number: ?    Any additional details: PT WOULD LIKE REFERRAL TO ENDO.

## 2025-04-30 NOTE — TELEPHONE ENCOUNTER
Caller: Sherrie Harris    Relationship to patient: Self      Best call back number: [phone]     Provider: ADAIR FAULKNER    Medication PA needed: FARXIGA 5 MG    Reason for call/Prior Auth: INSURANCE    FORM WAS FAXED TO OFFICE FROM INSURER. NEED TO SHOW PT HAS TRIED [facility]. PLEASE FILL OUT SIGN AND FAX BACK.

## 2025-04-30 NOTE — TELEPHONE ENCOUNTER
Called pt and informed her that the insurance care we have on file Medicaid was termed. Pt states that's not the insurance she has now. She states she has a new one and we should have it on file. Informed her that the only insurance we have listed is the termed medicaid and workers comp. Pt states she has a new one and will bring the card to the office.

## 2025-05-05 NOTE — TELEPHONE ENCOUNTER
Pt walked into the clinic requesting to speak to me. I spoke to patient and she is upset that she has not been able to get her medication Farxiga 5mg. I informed her that she did not give us the right card when she came into the office the other day to have it updated. I told her that I had to call pharmacy and ask for the pharmacy help desk number since I have not been able to get any information off her current insurance card to help me initiate her PA. Pt states that when she spoke to the insurance company they told her that they faxed the PA form on 04/16. I looked through patients media while in front of patient and informed her the only items that are scanned into her chart are from 02/2025 and her most recent card on 04/30. Informed her that the office only has one fax line and 11 providers and most of the time we dont get all the faxes we need. She states its absolutely ridiculous she can't get in to see a specialist and it takes 6 months to see any one. I did however give her two boxes of samples along with a coupon card. She took them and left.

## 2025-05-05 NOTE — TELEPHONE ENCOUNTER
Called patients insurance company [employer] and tim ANDERS  [phone]    Need to watch for fax from [employer]

## 2025-05-14 ENCOUNTER — OFFICE VISIT (OUTPATIENT)
Dept: FAMILY MEDICINE CLINIC | Facility: CLINIC | Age: 55
End: 2025-05-14
Payer: COMMERCIAL

## 2025-05-14 ENCOUNTER — TELEPHONE (OUTPATIENT)
Dept: FAMILY MEDICINE CLINIC | Facility: CLINIC | Age: 55
End: 2025-05-14

## 2025-05-14 VITALS
HEIGHT: 68 IN | DIASTOLIC BLOOD PRESSURE: 84 MMHG | HEART RATE: 63 BPM | OXYGEN SATURATION: 99 % | WEIGHT: 153.56 LBS | BODY MASS INDEX: 23.27 KG/M2 | SYSTOLIC BLOOD PRESSURE: 132 MMHG

## 2025-05-14 DIAGNOSIS — I10 BENIGN ESSENTIAL HTN: Primary | ICD-10-CM

## 2025-05-14 DIAGNOSIS — S99.921A INJURY OF TOE ON RIGHT FOOT, INITIAL ENCOUNTER: ICD-10-CM

## 2025-05-14 DIAGNOSIS — E11.9 TYPE 2 DIABETES MELLITUS WITHOUT COMPLICATION, WITHOUT LONG-TERM CURRENT USE OF INSULIN: ICD-10-CM

## 2025-05-14 DIAGNOSIS — Z12.11 SCREENING FOR COLON CANCER: ICD-10-CM

## 2025-05-14 DIAGNOSIS — Z12.31 ENCOUNTER FOR SCREENING MAMMOGRAM FOR MALIGNANT NEOPLASM OF BREAST: ICD-10-CM

## 2025-05-14 DIAGNOSIS — Z12.4 SCREENING FOR CERVICAL CANCER: ICD-10-CM

## 2025-05-14 LAB
EXPIRATION DATE: ABNORMAL
HBA1C MFR BLD: 6.9 % (ref 4.5–5.7)
Lab: ABNORMAL

## 2025-05-14 RX ORDER — DAPAGLIFLOZIN 10 MG/1
10 TABLET, FILM COATED ORAL DAILY
Qty: 90 TABLET | Refills: 1 | Status: SHIPPED | OUTPATIENT
Start: 2025-05-14

## 2025-05-14 RX ORDER — HYDROCHLOROTHIAZIDE 25 MG/1
25 TABLET ORAL DAILY
Qty: 90 TABLET | Refills: 3 | Status: SHIPPED | OUTPATIENT
Start: 2025-05-14

## 2025-05-14 RX ORDER — AMLODIPINE BESYLATE 5 MG/1
5 TABLET ORAL DAILY
Qty: 90 TABLET | Refills: 3 | Status: SHIPPED | OUTPATIENT
Start: 2025-05-14

## 2025-05-14 RX ORDER — CEPHALEXIN 500 MG/1
500 CAPSULE ORAL 3 TIMES DAILY
Qty: 30 CAPSULE | Refills: 0 | Status: SHIPPED | OUTPATIENT
Start: 2025-05-14

## 2025-05-14 NOTE — PROGRESS NOTES
Chief Complaint  Hypertension and Diabetes    Subjective          Sherrie Harris presents to [facility]  Hypertension  Associated symptoms: no shortness of breath    Diabetes  Associated symptoms:     no fatigue, no polydipsia, no polyphagia and no polyuria      History of Present Illness  The patient is a 54-year-old female who presents for follow-up of hypertension and diabetes.    She is currently on amlodipine, and her blood pressure is well controlled, staying below 140/90. She reports no dizziness, headache, chest pain, chest pressure, shortness of breath, or trouble breathing.    She is on Farxiga for her diabetes and reports that her feet are fine, but she has a sore toe from recent injury. She maintains a healthy diet and stays active. She does not smoke. She does not monitor her blood glucose levels at home. Her last A1c in February was 7.5%. She has been off metformin since 04/14/2025 due to GI side effects including diarrhea and appetite change. She has not yet undergone a mammogram or colonoscopy and knows she is due and continues to deny for the time being. She had an eye examination within the past year.    She injured her right second toe on Saturday (states she stubbed it), resulting in pain and redness on top of toe. The redness has since improved but still present. She has not been able to walk her normal 3-4 miles this week. No fever chills no bodyaches.  States she is not concerned for a break but is concerned for possible infection due to the redness, especially with her history of diabetes.  No nail involvement.    She is also asking for a referral to endocrinology just to evaluate her diabetes further.    SOCIAL HISTORY  She does not smoke.    Patient Care Team:  Johnny Sanchez APRN as PCP - General (Nurse Practitioner)  Óscar Gallardo MD as Consulting Physician (Orthopedic Surgery)  Brian Sanchez MD as Consulting Physician  "(Rheumatology)  Hafsa Sawyer PA-C as Physician Assistant (Physician Assistant)     Objective   Vital Signs:   /84   Pulse 63   Ht 171.5 cm (67.52\")   Wt 69.7 kg (153 lb 9 oz)   SpO2 99%   BMI 23.68 kg/m²     Body mass index is 23.68 kg/m².    Review of Systems   Constitutional:  Negative for chills, fatigue and fever.   HENT:  Negative for congestion.    Eyes:  Negative for visual disturbance.   Respiratory:  Negative for cough, shortness of breath and wheezing.    Cardiovascular: Negative.    Gastrointestinal:  Negative for abdominal pain, diarrhea, nausea and vomiting.   Endocrine: Negative for polydipsia, polyphagia and polyuria.   Genitourinary:  Negative for decreased urine volume, dysuria, frequency, hematuria and urgency.   Musculoskeletal:  Negative for back pain.   Skin: Negative.    Neurological:  Negative for headache.       Past History:  Medical History: has a past medical history of Broken thumb, Diabetes, Hypertension, Left shoulder pain, and Palpitations.   Surgical History: has a past surgical history that includes Lipoma Excision (2016); Uterine fibroid surgery; ORIF finger / thumb fracture (2004); Shoulder arthroscopy (Left, 03/06/2018); and Foot surgery (Right).   Family History: family history includes Diabetes in her mother; Heart failure in her mother; Hypertension in her father and mother; Lupus in her father.   Social History: reports that she has never smoked. She has never been exposed to tobacco smoke. She has never used smokeless tobacco. She reports current alcohol use. She reports that she does not use drugs.    PHQ-2 Depression Screening  Little interest or pleasure in doing things? Not at all   Feeling down, depressed, or hopeless? Not at all   PHQ-2 Total Score 0       PHQ-9 Depression Screening  Little interest or pleasure in doing things? Not at all   Feeling down, depressed, or hopeless? Not at all   PHQ-2 Total Score 0   Trouble falling or staying asleep, or " sleeping too much?     Feeling tired or having little energy?     Poor appetite or overeating?     Feeling bad about yourself - or that you are a failure or have let yourself or your family down?     Trouble concentrating on things, such as reading the newspaper or watching television?     Moving or speaking so slowly that other people could have noticed? Or the opposite - being so fidgety or restless that you have been moving around a lot more than usual?     Thoughts that you would be better off dead, or of hurting yourself in some way?     PHQ-9 Total Score     If you checked off any problems, how difficult have these problems made it for you to do your work, take care of things at home, or get along with other people? Not difficult at all        PHQ-9 Total Score:        Patient screened positive for depression based on a PHQ-9 score of  on . Follow-up recommendations include:          Current Outpatient Medications:     amLODIPine (NORVASC) 5 MG tablet, Take 1 tablet by mouth Daily., Disp: 90 tablet, Rfl: 3    fluticasone (FLONASE) 50 MCG/ACT nasal spray, Administer 2 sprays into the nostril(s) as directed by provider Daily., Disp: 16 g, Rfl: 2    hydroCHLOROthiazide 25 MG tablet, Take 1 tablet by mouth Daily., Disp: 90 tablet, Rfl: 3    hydroxychloroquine (Plaquenil) 200 MG tablet, Take 1 tablet by mouth Daily., Disp: 30 tablet, Rfl: 5    meloxicam (Mobic) 7.5 MG tablet, Take 1 tablet by mouth Daily., Disp: 30 tablet, Rfl: 2    VITAMIN D PO, Take 3,000 Units by mouth., Disp: , Rfl:     cephalexin (KEFLEX) 500 MG capsule, Take 1 capsule by mouth 3 (Three) Times a Day., Disp: 30 capsule, Rfl: 0    dapagliflozin Propanediol (Farxiga) 10 MG tablet, Take 10 mg by mouth Daily., Disp: 90 tablet, Rfl: 1   (Not in a hospital admission)     Allergies: Amoxicillin-pot clavulanate    Physical Exam  Constitutional:       Appearance: Normal appearance.   Eyes:      Conjunctiva/sclera: Conjunctivae normal.      Pupils:  Pupils are equal, round, and reactive to light.   Cardiovascular:      Rate and Rhythm: Normal rate and regular rhythm.      Heart sounds: Normal heart sounds.   Pulmonary:      Effort: Pulmonary effort is normal.      Breath sounds: Normal breath sounds.   Skin:     Comments: Top of right second toe tender and slightly erythematous.  No obvious open skin.  No discharge.   Neurological:      General: No focal deficit present.      Mental Status: She is alert and oriented to person, place, and time. Mental status is at baseline.   Psychiatric:         Mood and Affect: Mood normal.         Behavior: Behavior normal.         Thought Content: Thought content normal.         Judgment: Judgment normal.        Physical Exam  Eyes: Pupils equal and round, conjunctivae clear  Neck: Supple, no abnormalities  Respiratory: Clear to auscultation, no wheezing, rales or rhonchi  Extremities: Redness noted on right second toe, sore upon palpation    Result Review :          Results               Assessment and Plan    Diagnoses and all orders for this visit:    1. Benign essential HTN (Primary)  -     Comprehensive Metabolic Panel  -     Lipid Panel  -     TSH Rfx On Abnormal To Free T4  -     amLODIPine (NORVASC) 5 MG tablet; Take 1 tablet by mouth Daily.  Dispense: 90 tablet; Refill: 3  -     hydroCHLOROthiazide 25 MG tablet; Take 1 tablet by mouth Daily.  Dispense: 90 tablet; Refill: 3    2. Type 2 diabetes mellitus without complication, without long-term current use of insulin  -     Microalbumin / Creatinine Urine Ratio - Urine, Clean Catch  -     POC Glycosylated Hemoglobin (Hb A1C); Future  -     POC Glycosylated Hemoglobin (Hb A1C)  -     dapagliflozin Propanediol (Farxiga) 10 MG tablet; Take 10 mg by mouth Daily.  Dispense: 90 tablet; Refill: 1  -     Ambulatory Referral to Endocrinology    3. Screening for colon cancer    4. Screening for cervical cancer    5. Encounter for screening mammogram for malignant neoplasm of  breast    6. Injury of toe on right foot, initial encounter  -     cephalexin (KEFLEX) 500 MG capsule; Take 1 capsule by mouth 3 (Three) Times a Day.  Dispense: 30 capsule; Refill: 0      Assessment & Plan  1. Hypertension.  - Blood pressure is well controlled on amlodipine, consistently staying below 140/90.  Continue current medications.  Goal blood pressure less than 140/90.  Let me know if gets to or above that.  Proper diet and exercise plan discussed and encouraged.    2. Diabetes mellitus.  - Currently on Farxiga for diabetes management. Last A1c in February was 7.5.  - An A1c test will be conducted today to determine if an increase in Farxiga dosage from 5 mg to 10 mg is necessary.  - Will continue to receive Farxiga samples until insurance approval is confirmed. If insurance does not cover Farxiga, alternative medications will be considered. A referral to a specialist at  or [facility] will be made.  - Comprehensive blood work, including liver, kidney, cholesterol, and thyroid function tests, will be performed. Advised to monitor blood sugar levels at home.  - A1c has improved today to 6.9.  Would like her to be closer to an A1c of 6 so we will go ahead and increase Farxiga to 10 mg daily.  I will go ahead and asked Shayla to start a PA on this medication.  Risk of meds discussed and understood.  Education provided.  Annual dental and eye exams encouraged.  Check feet daily.  Return to clinic or ED with any issues or concerns.    3. Right second toe injury.  - Reported stubbing right second toe on Saturday, resulting in soreness and redness.  - Given diabetic status, there is a risk of infection. Keflex will be prescribed as a precautionary measure against potential infection. Pt. States she does fine with cephalosporins and safe for her to take.   - Patient denies x-ray.  Ice in 15-minute intervals as needed.  Risk of meds discussed and understood.  Education provided.  Return in 2 to 3 days if no  improvement, sooner if worsens.    4. Health maintenance.  - Due for a mammogram, Pap smear, and colonoscopy. These screenings were discussed and recommended.  She denies doing any of them at this time and understands the risks.  She states she will consider them and let me know when and if she changes her mind.  Encouraged her to discuss pneumonia and shingles vaccine with her pharmacist.            BMI is within normal parameters. No other follow-up for BMI required.       Follow Up   Return in about 3 months (around 8/14/2025), or if symptoms worsen or fail to improve.  Patient was given instructions and counseling regarding her condition or for health maintenance advice. Please see specific information pulled into the AVS if appropriate.     Patient or patient representative verbalized consent for the use of Ambient Listening during the visit with  MARCIE Stacy for chart documentation. 5/14/2025  08:03 EDT    MARCIE Stacy

## 2025-05-14 NOTE — TELEPHONE ENCOUNTER
I sent in a new prescription of Farxiga 10 mg.  Please contact pharmacy to get PA started.  She has tried metformin in the past and it caused terrible GI issues so she cannot take that.  Thanks

## 2025-05-14 NOTE — TELEPHONE ENCOUNTER
Talked to [facility] and they are unsure if it needs a PA but state that they will send over a paper if needed

## 2025-05-15 ENCOUNTER — TELEPHONE (OUTPATIENT)
Dept: FAMILY MEDICINE CLINIC | Facility: CLINIC | Age: 55
End: 2025-05-15

## 2025-05-15 LAB
ALBUMIN SERPL-MCNC: 4.6 G/DL (ref 3.8–4.9)
ALBUMIN/CREAT UR: <13 MG/G CREAT (ref 0–29)
ALP SERPL-CCNC: 74 IU/L (ref 44–121)
ALT SERPL-CCNC: 15 IU/L (ref 0–32)
AST SERPL-CCNC: 18 IU/L (ref 0–40)
BILIRUB SERPL-MCNC: 0.6 MG/DL (ref 0–1.2)
BUN SERPL-MCNC: 20 MG/DL (ref 6–24)
BUN/CREAT SERPL: 32 (ref 9–23)
CALCIUM SERPL-MCNC: 9.6 MG/DL (ref 8.7–10.2)
CHLORIDE SERPL-SCNC: 97 MMOL/L (ref 96–106)
CHOLEST SERPL-MCNC: 196 MG/DL (ref 100–199)
CO2 SERPL-SCNC: 24 MMOL/L (ref 20–29)
CREAT SERPL-MCNC: 0.62 MG/DL (ref 0.57–1)
CREAT UR-MCNC: 23.4 MG/DL
EGFRCR SERPLBLD CKD-EPI 2021: 104 ML/MIN/1.73
GLOBULIN SER CALC-MCNC: 2.6 G/DL (ref 1.5–4.5)
GLUCOSE SERPL-MCNC: 146 MG/DL (ref 70–99)
HDLC SERPL-MCNC: 102 MG/DL
LDLC SERPL CALC-MCNC: 85 MG/DL (ref 0–99)
MICROALBUMIN UR-MCNC: <3 UG/ML
POTASSIUM SERPL-SCNC: 3.6 MMOL/L (ref 3.5–5.2)
PROT SERPL-MCNC: 7.2 G/DL (ref 6–8.5)
SODIUM SERPL-SCNC: 138 MMOL/L (ref 134–144)
TRIGL SERPL-MCNC: 44 MG/DL (ref 0–149)
TSH SERPL DL<=0.005 MIU/L-ACNC: 1.59 UIU/ML (ref 0.45–4.5)
VLDLC SERPL CALC-MCNC: 9 MG/DL (ref 5–40)

## 2025-05-15 NOTE — TELEPHONE ENCOUNTER
Caller: MAX    Relationship: [employer]     Best call back number:     [phone]       What was the call regarding: [employer] IS CALLING TO SEE IF THE OFFICE RECEIVED THE PAPER WORK THAT WAS FAXED OVER ON 05/14/25 ABOUT THE [facility]. PLEASE FILL OUT THE PAPER WORK AND FAX BACK [phone]

## 2025-05-16 ENCOUNTER — TELEPHONE (OUTPATIENT)
Dept: FAMILY MEDICINE CLINIC | Facility: CLINIC | Age: 55
End: 2025-05-16
Payer: COMMERCIAL

## 2025-05-20 ENCOUNTER — TELEPHONE (OUTPATIENT)
Dept: FAMILY MEDICINE CLINIC | Facility: CLINIC | Age: 55
End: 2025-05-20
Payer: COMMERCIAL

## 2025-05-28 ENCOUNTER — TELEPHONE (OUTPATIENT)
Dept: FAMILY MEDICINE CLINIC | Facility: CLINIC | Age: 55
End: 2025-05-28
Payer: COMMERCIAL

## 2025-05-29 ENCOUNTER — RESULTS FOLLOW-UP (OUTPATIENT)
Dept: FAMILY MEDICINE CLINIC | Facility: CLINIC | Age: 55
End: 2025-05-29
Payer: COMMERCIAL

## 2025-05-29 ENCOUNTER — TELEPHONE (OUTPATIENT)
Dept: FAMILY MEDICINE CLINIC | Facility: CLINIC | Age: 55
End: 2025-05-29
Payer: COMMERCIAL

## 2025-05-29 NOTE — TELEPHONE ENCOUNTER
LVM with detailed provider message.       HUB TO RELAY    Please let patient know her blood work is unremarkable and looks good. Thanks

## 2025-06-03 ENCOUNTER — TELEPHONE (OUTPATIENT)
Dept: FAMILY MEDICINE CLINIC | Facility: CLINIC | Age: 55
End: 2025-06-03
Payer: COMMERCIAL

## 2025-06-03 NOTE — TELEPHONE ENCOUNTER
Please let patient know that her Farxiga is continuing to be denied.  I am not sure if there is anything else that we can do for this.  Would she be open to trying Actos which is another type of diabetes medication instead.  It should be affordable.  Let me know.  Thanks

## 2025-06-03 NOTE — TELEPHONE ENCOUNTER
See messages below.  That was just me being told that the Farxiga had been denied.  If insurance is covering though and is affordable she can continue with that.  Thanks

## 2025-06-03 NOTE — TELEPHONE ENCOUNTER
Per the pharmacy the PA was approved and the medication was covered by the insurance for $15 for 90 day supply.  Pt has picked up script.

## 2025-06-03 NOTE — TELEPHONE ENCOUNTER
[facility]    HUB TO RELAY    Please let patient know that her Farxiga is continuing to be denied.  I am not sure if there is anything else that we can do for this.  Would she be open to trying Actos which is another type of diabetes medication instead.  It should be affordable.  Let me know.  Thanks

## 2025-06-03 NOTE — TELEPHONE ENCOUNTER
Name: Sherrie Harris    Relationship: Self    Best Callback Number: [phone]     HUB PROVIDED THE RELAY MESSAGE FROM THE OFFICE   PATIENT HAS FURTHER QUESTIONS AND WOULD LIKE A CALL BACK    ADDITIONAL INFORMATION: THE PATIENT REPORTS SHE PICKED UP THE FARXIGA LAST WEEK AND IT ONLY COST HER $15.00 OUT OF POCKET. PLEASE CALL TO DISCUSS.

## 2025-06-25 ENCOUNTER — TELEPHONE (OUTPATIENT)
Dept: FAMILY MEDICINE CLINIC | Facility: CLINIC | Age: 55
End: 2025-06-25
Payer: COMMERCIAL

## 2025-06-25 NOTE — TELEPHONE ENCOUNTER
Name: Kimberly Sherrie M      Relationship: Self      Best Callback Number: [phone]       HUB PROVIDED THE RELAY MESSAGE FROM THE OFFICE      PATIENT: VOICED UNDERSTANDING AND HAS NO FURTHER QUESTIONS AT THIS TIME    ADDITIONAL INFORMATION:

## 2025-07-03 ENCOUNTER — TELEPHONE (OUTPATIENT)
Dept: FAMILY MEDICINE CLINIC | Facility: CLINIC | Age: 55
End: 2025-07-03

## 2025-07-03 NOTE — TELEPHONE ENCOUNTER
Patient is due mammogram.  Looks like they have been trying to get a hold of her with no luck to get this scheduled.  Would she like to have this reordered.  She is due a mammogram.  Thanks

## 2025-07-03 NOTE — TELEPHONE ENCOUNTER
Spoke to patient and she states that no she has not had it done but she will call and get it scheduled when she's ready.

## 2025-07-22 ENCOUNTER — OFFICE VISIT (OUTPATIENT)
Dept: FAMILY MEDICINE CLINIC | Facility: CLINIC | Age: 55
End: 2025-07-22
Payer: COMMERCIAL

## 2025-07-22 VITALS
OXYGEN SATURATION: 99 % | WEIGHT: 156 LBS | HEIGHT: 70 IN | SYSTOLIC BLOOD PRESSURE: 120 MMHG | HEART RATE: 81 BPM | BODY MASS INDEX: 22.33 KG/M2 | DIASTOLIC BLOOD PRESSURE: 70 MMHG

## 2025-07-22 DIAGNOSIS — R30.0 DYSURIA: Primary | ICD-10-CM

## 2025-07-22 DIAGNOSIS — N76.0 ACUTE VAGINITIS: ICD-10-CM

## 2025-07-22 LAB
BILIRUB BLD-MCNC: NEGATIVE MG/DL
CLARITY, POC: CLEAR
COLOR UR: YELLOW
EXPIRATION DATE: ABNORMAL
GLUCOSE UR STRIP-MCNC: ABNORMAL MG/DL
KETONES UR QL: NEGATIVE
LEUKOCYTE EST, POC: NEGATIVE
Lab: ABNORMAL
NITRITE UR-MCNC: NEGATIVE MG/ML
PH UR: 6 [PH] (ref 5–8)
PROT UR STRIP-MCNC: NEGATIVE MG/DL
RBC # UR STRIP: NEGATIVE /UL
SP GR UR: 1 (ref 1–1.03)
UROBILINOGEN UR QL: ABNORMAL

## 2025-07-22 PROCEDURE — 81003 URINALYSIS AUTO W/O SCOPE: CPT | Performed by: NURSE PRACTITIONER

## 2025-07-22 PROCEDURE — 99213 OFFICE O/P EST LOW 20 MIN: CPT | Performed by: NURSE PRACTITIONER

## 2025-07-22 RX ORDER — FLUCONAZOLE 150 MG/1
TABLET ORAL
Qty: 2 TABLET | Refills: 0 | Status: SHIPPED | OUTPATIENT
Start: 2025-07-22

## 2025-07-22 NOTE — PROGRESS NOTES
Office Note     Name: Sherrie Harris    : 1970     MRN: [id number]     Chief Complaint  Vaginitis    Subjective     History of Present Illness:  Sherrie Harris is a 54 y.o. female who presents today for c/o burning and itching. This been going on for about 2-2 1/2 weeks now. She states she took 2 augmentin (1 last night and 1 the night before) which helped her s/s. Denies n/v/abd pain/diarrhea, suprapubic or LBP. She is concerned may be related to recent increase in Farxiga. She is seeing  Endocrinology, Yuliya Harry, who is prescribing her diabetic medications.     Review of Systems:   Review of Systems as per HPI    Family History:   Family History   Problem Relation Age of Onset    Diabetes Mother     Hypertension Mother     Heart failure Mother     Hypertension Father     Lupus Father     Malig Hyperthermia Neg Hx        Social History:   Social History     Socioeconomic History    Marital status: Single   Tobacco Use    Smoking status: Never     Passive exposure: Never    Smokeless tobacco: Never   Vaping Use    Vaping status: Never Used   Substance and Sexual Activity    Alcohol use: Yes     Comment: OCCASIONAL    Drug use: No    Sexual activity: Defer       Past Medical History:   Past Medical History:   Diagnosis Date    Broken thumb     LEFT THUMB    Diabetes     Hypertension     Left shoulder pain     Palpitations        Past Surgical History:   Past Surgical History:   Procedure Laterality Date    FOOT SURGERY Right     LIPOMA EXCISION  2016    LEFT SHOULDER    ORIF FINGER / THUMB FRACTURE      LEFT THUMB     SHOULDER ARTHROSCOPY Left 2018    Procedure: LEFT SHOULDER ARTHROSCOPY,  SUBACROMIAL DECOMPRESSION,  DISTAL CLAVICLE EXCISION,  BICEPS TENOTOMY, and LABRAL DEBRIDEMENT;  Surgeon: TITUS Huntley MD;  [place] [facility] [place] [facility] [place]  Service:     UTERINE FIBROID SURGERY         Immunizations:   Immunization History   Administered Date(s) Administered    31-influenza Vac  "Quardvalent Preservativ 10/01/2020    COVID-19 (MODERNA) 1st,2nd,3rd Dose Monovalent 02/01/2020, 03/25/2021, 04/30/2021    Flublok 18+yrs 10/10/2022    Fluzone  >6mos 11/04/2024    Fluzone (or Fluarix & Flulaval for VFC) >6mos 10/01/2018, 10/01/2020, 10/16/2023    Fluzone Quad >6mos (Multi-dose) 10/01/2018    HEP A LIVE ATTENUATED 10/01/2018    Hepatitis A 10/01/2018    INFLUENZA NASAL UF 01/01/2022    Influenza MDCK Quadrivalent with Preserative 10/21/2021    Tdap 06/06/2024        Medications:     Current Outpatient Medications:     amLODIPine (NORVASC) 5 MG tablet, Take 1 tablet by mouth Daily., Disp: 90 tablet, Rfl: 3    dapagliflozin Propanediol (Farxiga) 10 MG tablet, Take 10 mg by mouth Daily., Disp: 90 tablet, Rfl: 1    fluticasone (FLONASE) 50 MCG/ACT nasal spray, Administer 2 sprays into the nostril(s) as directed by provider Daily., Disp: 16 g, Rfl: 2    hydroCHLOROthiazide 25 MG tablet, Take 1 tablet by mouth Daily., Disp: 90 tablet, Rfl: 3    VITAMIN D PO, Take 3,000 Units by mouth., Disp: , Rfl:     fluconazole (Diflucan) 150 MG tablet, Take 1 tablet today; then 1 tablet in 3 days., Disp: 2 tablet, Rfl: 0    Allergies:   Allergies   Allergen Reactions    Amoxicillin-Pot Clavulanate Hives       Objective     Vital Signs  /70   Pulse 81   Ht 176.5 cm (69.5\")   Wt 70.8 kg (156 lb)   SpO2 99%   BMI 22.71 kg/m²   Estimated body mass index is 22.71 kg/m² as calculated from the following:    Height as of this encounter: 176.5 cm (69.5\").    Weight as of this encounter: 70.8 kg (156 lb).    BMI is within normal parameters. No other follow-up for BMI required.      Physical Exam  Vitals and nursing note reviewed.   Constitutional:       General: She is not in acute distress.     Appearance: Normal appearance. She is normal weight. She is not ill-appearing.   Cardiovascular:      Rate and Rhythm: Normal rate and regular rhythm.   Pulmonary:      Effort: Pulmonary effort is normal.   Abdominal:      " General: Abdomen is flat. There is no distension.      Palpations: Abdomen is soft.      Tenderness: There is no abdominal tenderness. There is no right CVA tenderness, left CVA tenderness or rebound.   Skin:     General: Skin is warm and dry.   Neurological:      Mental Status: She is alert and oriented to person, place, and time.   Psychiatric:         Mood and Affect: Mood normal.         Behavior: Behavior normal.         Thought Content: Thought content normal.         Judgment: Judgment normal.          Procedures    Assessment and Plan     1. Dysuria    - POCT urinalysis dipstick, automated  - Urine Culture - Urine, Urine, Clean Catch    2. Acute vaginitis    - fluconazole (Diflucan) 150 MG tablet; Take 1 tablet today; then 1 tablet in 3 days.  Dispense: 2 tablet; Refill: 0     Reviewed results of in-house urinalysis which were essentially normal except for positive glucose.  Explained this is the result of farxiga working appropriately. Reviewed use of Diflucan including side effects.  Will contact with urine culture results upon receipt.  Advised patient to follow-up with endocrinology if symptoms worsen or persist as she may need to change medications.  Patient stated understanding and agreed with plan of care.    Follow Up  Return if symptoms worsen or fail to improve.    MARCIE Danielle PC [facility]  [address] [facility] [address] [phone]

## 2025-07-23 LAB
BACTERIA UR CULT: NO GROWTH
BACTERIA UR CULT: NORMAL

## (undated) DEVICE — SKIN PREP TRAY W/CHG: Brand: MEDLINE INDUSTRIES, INC.

## (undated) DEVICE — BLD SHAVER RESEC SABRE COOLCUT 5MM 13CM

## (undated) DEVICE — PK ARTHSCP SHLDR TOWER 40

## (undated) DEVICE — DRSNG WND GZ CURAD OIL EMULSION 3X3IN STRL

## (undated) DEVICE — POSTN ARMSLV LAT/TRACTION DISP

## (undated) DEVICE — ARM SLING: Brand: DEROYAL

## (undated) DEVICE — SUT ETHLN 4/0 PS2 PLSTC 1667G

## (undated) DEVICE — TUBING SET, GRAVITY, 4-SPIKE

## (undated) DEVICE — GLV SURG BIOGEL LTX PF 8

## (undated) DEVICE — DRAPE,U/ SHT,SPLIT,PLAS,STERIL: Brand: MEDLINE

## (undated) DEVICE — CANN TRPL DAM 7X7MM

## (undated) DEVICE — GLV SURG TRIUMPH CLASSIC PF LTX 7.5 STRL

## (undated) DEVICE — BUR SHAVER CLEARCUT 12FLUT 5MM 13CM

## (undated) DEVICE — SUPER TURBOVAC 90 IFS: Brand: COBLATION